# Patient Record
Sex: MALE | Race: WHITE | Employment: OTHER | URBAN - METROPOLITAN AREA
[De-identification: names, ages, dates, MRNs, and addresses within clinical notes are randomized per-mention and may not be internally consistent; named-entity substitution may affect disease eponyms.]

---

## 2021-11-26 ENCOUNTER — OFFICE VISIT (OUTPATIENT)
Dept: FAMILY MEDICINE CLINIC | Age: 56
End: 2021-11-26
Payer: COMMERCIAL

## 2021-11-26 VITALS
DIASTOLIC BLOOD PRESSURE: 89 MMHG | BODY MASS INDEX: 31.58 KG/M2 | WEIGHT: 213.2 LBS | HEART RATE: 70 BPM | SYSTOLIC BLOOD PRESSURE: 126 MMHG | HEIGHT: 69 IN | TEMPERATURE: 96.6 F

## 2021-11-26 DIAGNOSIS — K59.00 CONSTIPATION, UNSPECIFIED CONSTIPATION TYPE: Primary | ICD-10-CM

## 2021-11-26 DIAGNOSIS — Z86.59 HISTORY OF DEPRESSION: ICD-10-CM

## 2021-11-26 DIAGNOSIS — Z13.1 SCREENING FOR DIABETES MELLITUS: ICD-10-CM

## 2021-11-26 DIAGNOSIS — Z76.89 ENCOUNTER TO ESTABLISH CARE WITH NEW DOCTOR: ICD-10-CM

## 2021-11-26 DIAGNOSIS — Z00.00 HEALTH MAINTENANCE EXAMINATION: ICD-10-CM

## 2021-11-26 LAB — HBA1C MFR BLD: 5.8 %

## 2021-11-26 PROCEDURE — G8484 FLU IMMUNIZE NO ADMIN: HCPCS | Performed by: STUDENT IN AN ORGANIZED HEALTH CARE EDUCATION/TRAINING PROGRAM

## 2021-11-26 PROCEDURE — 99203 OFFICE O/P NEW LOW 30 MIN: CPT | Performed by: STUDENT IN AN ORGANIZED HEALTH CARE EDUCATION/TRAINING PROGRAM

## 2021-11-26 PROCEDURE — G8427 DOCREV CUR MEDS BY ELIG CLIN: HCPCS | Performed by: STUDENT IN AN ORGANIZED HEALTH CARE EDUCATION/TRAINING PROGRAM

## 2021-11-26 PROCEDURE — 4004F PT TOBACCO SCREEN RCVD TLK: CPT | Performed by: STUDENT IN AN ORGANIZED HEALTH CARE EDUCATION/TRAINING PROGRAM

## 2021-11-26 PROCEDURE — 3017F COLORECTAL CA SCREEN DOC REV: CPT | Performed by: STUDENT IN AN ORGANIZED HEALTH CARE EDUCATION/TRAINING PROGRAM

## 2021-11-26 PROCEDURE — 83036 HEMOGLOBIN GLYCOSYLATED A1C: CPT | Performed by: STUDENT IN AN ORGANIZED HEALTH CARE EDUCATION/TRAINING PROGRAM

## 2021-11-26 PROCEDURE — G8417 CALC BMI ABV UP PARAM F/U: HCPCS | Performed by: STUDENT IN AN ORGANIZED HEALTH CARE EDUCATION/TRAINING PROGRAM

## 2021-11-26 RX ORDER — POLYETHYLENE GLYCOL 3350 17 G/17G
17 POWDER, FOR SOLUTION ORAL DAILY PRN
Qty: 510 G | Refills: 0 | Status: SHIPPED | OUTPATIENT
Start: 2021-11-26 | End: 2021-12-26

## 2021-11-26 RX ORDER — DOCUSATE SODIUM 100 MG/1
100 CAPSULE, LIQUID FILLED ORAL 2 TIMES DAILY
Qty: 60 CAPSULE | Refills: 0 | Status: SHIPPED | OUTPATIENT
Start: 2021-11-26 | End: 2021-12-26

## 2021-11-26 ASSESSMENT — ENCOUNTER SYMPTOMS
COUGH: 0
BACK PAIN: 1
NAUSEA: 0
DIARRHEA: 0
ABDOMINAL PAIN: 0
COLOR CHANGE: 0
CONSTIPATION: 0
SHORTNESS OF BREATH: 0
CHEST TIGHTNESS: 0
WHEEZING: 0

## 2021-11-26 ASSESSMENT — PATIENT HEALTH QUESTIONNAIRE - PHQ9
SUM OF ALL RESPONSES TO PHQ QUESTIONS 1-9: 2
SUM OF ALL RESPONSES TO PHQ QUESTIONS 1-9: 2
DEPRESSION UNABLE TO ASSESS: FUNCTIONAL CAPACITY MOTIVATION LIMITS ACCURACY
SUM OF ALL RESPONSES TO PHQ9 QUESTIONS 1 & 2: 2
2. FEELING DOWN, DEPRESSED OR HOPELESS: 1
SUM OF ALL RESPONSES TO PHQ QUESTIONS 1-9: 2
1. LITTLE INTEREST OR PLEASURE IN DOING THINGS: 1

## 2021-11-26 NOTE — PROGRESS NOTES
Subjective:    Lenard Bueno is a 55 y.o. male with  has no past medical history on file. No family history on file. Presented tot office today for:  Chief Complaint   Patient presents with    New Patient     EST CARE    Back Pain     Lower Back       HPI     Lenard Bueno is a 29-year-old male with no significant PMH on file. Patient is here today to establish care with this office. Per patient, he moved to the [facility] from [place] back in April 2021. Patient is currently unemployed and living at a shelter. He has no specific complaints today. However, reportedly patient states that he has a history of DJD with possible spinal stenosis. Patient is asking for referral to a surgeon. We will asked the patient at this time to sign a medical release form so we are able to obtain medical records from his physician in [place]. He denies any fever, chills, headaches, dizziness, lightheadedness, chest pain, S OB, palpitations, abdominal pain, nausea, vomiting, bloody stools, saddle anesthesia, urinary/fecal incontinence, numbness, tingling, or weakness    Constipation: Upon review of systems, patient states that he has been having constipation for the last 1 month. Reports having a bowel movement every 3 days. Denies any blood in stools. Patient is a poor historian. Occupation: unemployed  Previous PCP: no sure    PMHx: DJD, PTSD, MDD    FMHx:   -Mother: Alive; Type II DM   -Father: Alive; CAD   -Siblings:    Social Hx:   -Drink: currently does not drink   -Smoke: 1 PPD X 48 years   -Drugs: Allergies: Amoxicillin  Medications: None      Review of Systems   Constitutional: Negative for activity change, appetite change and fever. HENT: Negative for congestion. Respiratory: Negative for cough, chest tightness, shortness of breath and wheezing. Cardiovascular: Negative for chest pain. Gastrointestinal: Negative for abdominal pain, constipation, diarrhea and nausea.    Genitourinary: Negative for difficulty urinating. Musculoskeletal: Positive for back pain. Negative for joint swelling. Skin: Negative for color change. Neurological: Negative for dizziness, weakness, light-headedness, numbness and headaches. Psychiatric/Behavioral: Negative for agitation and confusion. Objective:    /89 (Site: Left Upper Arm, Position: Sitting, Cuff Size: Large Adult)   Pulse 70   Temp 96.6 °F (35.9 °C) (Temporal)   Ht 5' 9\" (1.753 m)   Wt 213 lb 3.2 oz (96.7 kg)   BMI 31.48 kg/m²    BP Readings from Last 3 Encounters:   11/26/21 126/89     Physical Exam  Constitutional:       General: He is not in acute distress. Appearance: Normal appearance. He is not ill-appearing. Cardiovascular:      Rate and Rhythm: Normal rate and regular rhythm. Heart sounds: No murmur heard. No gallop. Pulmonary:      Effort: Pulmonary effort is normal. No respiratory distress. Breath sounds: Normal breath sounds. No wheezing. Abdominal:      General: Bowel sounds are normal. There is no distension. Tenderness: There is no abdominal tenderness. There is no guarding or rebound. Hernia: No hernia is present. Musculoskeletal:         General: No swelling or deformity. Neurological:      General: No focal deficit present. Mental Status: He is alert and oriented to person, place, and time. No results found for: WBC, HGB, HCT, PLT, CHOL, TRIG, HDL, LDLDIRECT, ALT, AST, NA, K, CL, CREATININE, BUN, CO2, TSH, PSA, INR, GLUF, LABA1C, LABMICR  No results found for: CALCIUM, PHOS  No results found for: LDLCALC, LDLCHOLESTEROL, LDLDIRECT    Assessment and Plan:    1. Constipation, unspecified constipation type  -Ordered GlycoLax and Colace 100 mg twice daily  - polyethylene glycol (GLYCOLAX) 17 GM/SCOOP powder; Take 17 g by mouth daily as needed (constipation)  Dispense: 510 g; Refill: 0  - docusate sodium (COLACE) 100 MG capsule;  Take 1 capsule by mouth 2 times daily  Dispense: 60 control planned discussed Healthy diet and regular exercise. BP: 126/89 Blood pressure is normal. Treatment plan consists of No treatment change needed.     No results found for: LDLCALC, LDLCHOLESTEROL, LDLDIRECT (goal LDL reduction with dx if diabetes is 50% LDL reduction)      PHQ Scores 11/26/2021   PHQ2 Score 2   PHQ9 Score 2     Interpretation of Total Score Depression Severity: 1-4 = Minimal depression, 5-9 = Mild depression, 10-14 = Moderate depression, 15-19 = Moderately severe depression, 20-27 = Severe depression

## 2021-11-26 NOTE — PATIENT INSTRUCTIONS
Thank you for letting us take care of you today. We hope all your questions were addressed. If a question was overlooked or something else comes to mind after you return home, please contact a member of your Care Team listed below. Your Care Team at [place] [address] is Team #3  Harjinder Bai MD (Faculty)  Ladan Mabry MD (Faculty  Dianne Stephen MD (Resident)  Yen Shea (Resident)   Kassie Babb MD (Resident)  Donna Maguire MD (Resident)  Steph Cheema., YOHANA Stock., YOHANA Guajardo., Timothy Mathew., Joy Barkley ([address] [place] [address])  Milana Bond (Clinical Practice Manager)  [address] (Clinical Pharmacist)     Office phone number: [phone]    If you need to get in right away due to illness, please be advised we have \"Same Day\" appointments available Monday-Friday. Please call us at [phone] option #3 to schedule your \"Same Day\" appointment.

## 2022-01-18 ENCOUNTER — OFFICE VISIT (OUTPATIENT)
Dept: FAMILY MEDICINE CLINIC | Age: 57
End: 2022-01-18
Payer: COMMERCIAL

## 2022-01-18 ENCOUNTER — TELEPHONE (OUTPATIENT)
Dept: FAMILY MEDICINE CLINIC | Age: 57
End: 2022-01-18

## 2022-01-18 VITALS
TEMPERATURE: 97.2 F | SYSTOLIC BLOOD PRESSURE: 136 MMHG | HEIGHT: 69 IN | WEIGHT: 206.4 LBS | DIASTOLIC BLOOD PRESSURE: 83 MMHG | BODY MASS INDEX: 30.57 KG/M2 | HEART RATE: 80 BPM

## 2022-01-18 DIAGNOSIS — Z13.220 SCREENING FOR HYPERLIPIDEMIA: ICD-10-CM

## 2022-01-18 DIAGNOSIS — M54.16 LUMBAR RADICULOPATHY: Primary | ICD-10-CM

## 2022-01-18 PROCEDURE — 99213 OFFICE O/P EST LOW 20 MIN: CPT | Performed by: STUDENT IN AN ORGANIZED HEALTH CARE EDUCATION/TRAINING PROGRAM

## 2022-01-18 PROCEDURE — G8427 DOCREV CUR MEDS BY ELIG CLIN: HCPCS | Performed by: STUDENT IN AN ORGANIZED HEALTH CARE EDUCATION/TRAINING PROGRAM

## 2022-01-18 PROCEDURE — 3017F COLORECTAL CA SCREEN DOC REV: CPT | Performed by: STUDENT IN AN ORGANIZED HEALTH CARE EDUCATION/TRAINING PROGRAM

## 2022-01-18 PROCEDURE — G8484 FLU IMMUNIZE NO ADMIN: HCPCS | Performed by: STUDENT IN AN ORGANIZED HEALTH CARE EDUCATION/TRAINING PROGRAM

## 2022-01-18 PROCEDURE — 99211 OFF/OP EST MAY X REQ PHY/QHP: CPT | Performed by: FAMILY MEDICINE

## 2022-01-18 PROCEDURE — G8417 CALC BMI ABV UP PARAM F/U: HCPCS | Performed by: STUDENT IN AN ORGANIZED HEALTH CARE EDUCATION/TRAINING PROGRAM

## 2022-01-18 PROCEDURE — 4004F PT TOBACCO SCREEN RCVD TLK: CPT | Performed by: STUDENT IN AN ORGANIZED HEALTH CARE EDUCATION/TRAINING PROGRAM

## 2022-01-18 RX ORDER — IBUPROFEN 600 MG/1
600 TABLET ORAL 3 TIMES DAILY PRN
Qty: 90 TABLET | Refills: 0 | Status: SHIPPED | OUTPATIENT
Start: 2022-01-18 | End: 2022-05-04 | Stop reason: SDUPTHER

## 2022-01-18 ASSESSMENT — ENCOUNTER SYMPTOMS
CONSTIPATION: 0
ABDOMINAL PAIN: 0
SHORTNESS OF BREATH: 0
DIARRHEA: 0
WHEEZING: 0
COUGH: 0
CHEST TIGHTNESS: 0
COLOR CHANGE: 0
NAUSEA: 0

## 2022-01-18 NOTE — PROGRESS NOTES
Visit Information    Have you changed or started any medications since your last visit including any over-the-counter medicines, vitamins, or herbal medicines? no   Have you stopped taking any of your medications? Is so, why? -  no  Are you having any side effects from any of your medications? - no    Have you seen any other physician or provider since your last visit?  no   Have you had any other diagnostic tests since your last visit?  no   Have you been seen in the emergency room and/or had an admission in a hospital since we last saw you?  no   Have you had your routine dental cleaning in the past 6 months?  no     Do you have an active MyChart account? If no, what is the barrier?   No: code     Patient Care Team:  Jay Jay Kendall MD as PCP - General (Emergency Medicine)    Medical History Review  Past Medical, Family, and Social History reviewed and does not contribute to the patient presenting condition    Health Maintenance   Topic Date Due    Hepatitis C screen  Never done    COVID-19 Vaccine (1) Never done    Pneumococcal 0-64 years Vaccine (1 of 2 - PPSV23) Never done    HIV screen  Never done    DTaP/Tdap/Td vaccine (1 - Tdap) Never done    Lipid screen  Never done    Colon cancer screen colonoscopy  Never done    Shingles Vaccine (1 of 2) Never done    Low dose CT lung screening  Never done    Flu vaccine (1) Never done    A1C test (Diabetic or Prediabetic)  2022    Depression Screen  2022    Hepatitis A vaccine  Aged Out    Hepatitis B vaccine  Aged Out    Hib vaccine  Aged Out    Meningococcal (ACWY) vaccine  Aged Out

## 2022-01-18 NOTE — PROGRESS NOTES
Subjective:    Karie Still is a 55 y.o. male with  has no past medical history on file. No family history on file. Presented tothe office today for:  Chief Complaint   Patient presents with    Referral - General     orthopedica provider back    [facility] Maintenance     declined vaccines, and cologuard/colonscopy        HPI  Karie Still is a 70-year-old male with no significant PMH on file. Patient is here today requesting referral to an orthopedic surgeon. Patient was last seen in the office on 11/26/2021. At the time, patient was establishing care with this practice. Patient moved to the [facility] from [place] back in April 2021. He is currently unemployed and lives at a shelter. Reportedly, patient has a history of degenerative disc disease as well as spinal stenosis. Patient was asked during last visit to fill out a medical release form. However, records are still not available. Patient reports diffuse lower back pain radiating to both legs and feet. Reports numbness, tingling, and burning sensations. No fecal or urinary incontinence. His pain is not relieved by leaning forward. He rates the pain 6/10 today with recent exacerbations worsening his pain to 9/10. He claims to have a prior MRI done 01/21 that we have been unable to obtain. He reports to have degenerative changes leading to compression of his nerve roots, causing his symptoms. His pain was previously managed with steroid shots which substantially improved his symptoms. I will order an MRI to confirm radiculopathy. Patient is also asking for paperwork to be filled out. Review of Systems   Constitutional: Negative for activity change, appetite change and fever. HENT: Negative for congestion. Respiratory: Negative for cough, chest tightness, shortness of breath and wheezing. Cardiovascular: Negative for chest pain. Gastrointestinal: Negative for abdominal pain, constipation, diarrhea and nausea. Genitourinary: Negative for difficulty urinating. Musculoskeletal: Negative for joint swelling. Skin: Negative for color change. Neurological: Positive for numbness. Negative for dizziness, weakness, light-headedness and headaches. Psychiatric/Behavioral: Negative for agitation and confusion. Objective:    /83 (Site: Left Upper Arm, Position: Sitting, Cuff Size: Medium Adult) Comment: machine  Pulse 80   Temp 97.2 °F (36.2 °C) (Temporal)   Ht 5' 9\" (1.753 m)   Wt 206 lb 6.4 oz (93.6 kg)   BMI 30.48 kg/m²    BP Readings from Last 3 Encounters:   01/18/22 136/83   11/26/21 126/89     Physical Exam  Constitutional:       General: He is not in acute distress. Appearance: Normal appearance. He is not ill-appearing. HENT:      Head: Normocephalic and atraumatic. Mouth/Throat:      Mouth: Mucous membranes are moist.   Eyes:      Pupils: Pupils are equal, round, and reactive to light. Cardiovascular:      Rate and Rhythm: Normal rate and regular rhythm. Heart sounds: No murmur heard. No gallop. Pulmonary:      Effort: Pulmonary effort is normal. No respiratory distress. Breath sounds: Normal breath sounds. No wheezing. Abdominal:      General: Bowel sounds are normal. There is no distension. Tenderness: There is no abdominal tenderness. There is no guarding or rebound. Musculoskeletal:         General: No swelling or deformity. Neurological:      General: No focal deficit present. Mental Status: He is alert and oriented to person, place, and time. Psychiatric:         Mood and Affect: Mood normal.         Thought Content: Thought content normal.           Lab Results   Component Value Date    LABA1C 5.8 11/26/2021     No results found for: CALCIUM, PHOS  No results found for: LDLCALC, LDLCHOLESTEROL, LDLDIRECT    Assessment and Plan:    1.  Lumbar radiculopathy  -Unable to obtain previous medical records from [place]  -Will order MRI lumbar spine for suspected lumbar radiculopathy  -Will refer patient to Dr. Miguelina Galavizter orthopedic for further evaluation  -Scribed Motrin 600 mg 3 times daily as needed for pain  - MRI LUMBAR [address]; Future  - [facility] Jude Maldonado DO, Orthopedic Surgery, [facility]  - ibuprofen (ADVIL;MOTRIN) 600 MG tablet; Take 1 tablet by mouth 3 times daily as needed for Pain  Dispense: 90 tablet; Refill: 0    2. Screening for hyperlipidemia  -Reprinted labs          Requested Prescriptions     Signed Prescriptions Disp Refills    ibuprofen (ADVIL;MOTRIN) 600 MG tablet 90 tablet 0     Sig: Take 1 tablet by mouth 3 times daily as needed for Pain       There are no discontinued medications. Return in about 2 months (around 3/18/2022), or F/u low back pain. Ibis Linares received counseling on the following healthy behaviors:   Reviewed prior labs and health maintenance  Continue current medications, diet and exercise. Discussed use, benefit, and side effects of prescribed medications. Barriers to medication compliance addressed. Patient given educational materials - see patient instructions  Was a self-tracking handout given in paper form or via [employer]? No:     Requested Prescriptions     Signed Prescriptions Disp Refills    ibuprofen (ADVIL;MOTRIN) 600 MG tablet 90 tablet 0     Sig: Take 1 tablet by mouth 3 times daily as needed for Pain       All patient questions answered. Patient voiced understanding. Quality Measures    Body mass index is 30.48 kg/m². Elevated. Weight control planned discussed Healthy diet and regular exercise. BP: 136/83 (machine) Blood pressure is normal. Treatment plan consists of No treatment change needed.     No results found for: LDLCALC, LDLCHOLESTEROL, LDLDIRECT (goal LDL reduction with dx if diabetes is 50% LDL reduction)      PHQ Scores 11/26/2021   PHQ2 Score 2   PHQ9 Score 2     Interpretation of Total Score Depression Severity: 1-4 = Minimal depression, 5-9 = Mild depression, 10-14 = Moderate depression, 15-19 = Moderately severe depression, 20-27 = Severe depression

## 2022-01-18 NOTE — PATIENT INSTRUCTIONS
Thank you for letting us take care of you today. We hope all your questions were addressed. If a question was overlooked or something else comes to mind after you return home, please contact a member of your Care Team listed below. Your Care Team at [place] [address] is Team #3  Katelyn Patterson MD (Faculty)  Chriss Caban MD (Faculty  Estlacey Reyes MD (Resident)  Logan Paz (Resident)   Marianne Peck MD (Resident)  Susu Leach MD (Resident)  Benita Burton., YOHANA Aquino., YOHANA Aldridge., Martir Borrego., David Campoverde ([address] [place] [address])  [facility] (Clinical Practice Manager)  Susu Lopez, [address] [place] [address] (Clinical Pharmacist)     Office phone number: [phone]    If you need to get in right away due to illness, please be advised we have \"Same Day\" appointments available Monday-Friday. Please call us at [phone] option #3 to schedule your \"Same Day\" appointment.

## 2022-01-27 ENCOUNTER — HOSPITAL ENCOUNTER (OUTPATIENT)
Dept: MRI IMAGING | Facility: CLINIC | Age: 57
Discharge: HOME | End: 2022-01-29
Payer: COMMERCIAL

## 2022-01-27 DIAGNOSIS — M54.16 LUMBAR RADICULOPATHY: ICD-10-CM

## 2022-01-27 PROCEDURE — 72148 MRI LUMBAR SPINE W/O DYE: CPT

## 2022-02-14 DIAGNOSIS — M54.50 LOW BACK PAIN, UNSPECIFIED BACK PAIN LATERALITY, UNSPECIFIED CHRONICITY, UNSPECIFIED WHETHER SCIATICA PRESENT: Primary | ICD-10-CM

## 2022-02-24 ENCOUNTER — OFFICE VISIT (OUTPATIENT)
Dept: ORTHOPEDIC SURGERY | Age: 57
End: 2022-02-24
Payer: COMMERCIAL

## 2022-02-24 VITALS — HEIGHT: 69 IN | WEIGHT: 206 LBS | BODY MASS INDEX: 30.51 KG/M2

## 2022-02-24 DIAGNOSIS — M54.50 LUMBAR PAIN: Primary | ICD-10-CM

## 2022-02-24 PROCEDURE — G8417 CALC BMI ABV UP PARAM F/U: HCPCS | Performed by: STUDENT IN AN ORGANIZED HEALTH CARE EDUCATION/TRAINING PROGRAM

## 2022-02-24 PROCEDURE — 3017F COLORECTAL CA SCREEN DOC REV: CPT | Performed by: STUDENT IN AN ORGANIZED HEALTH CARE EDUCATION/TRAINING PROGRAM

## 2022-02-24 PROCEDURE — G8427 DOCREV CUR MEDS BY ELIG CLIN: HCPCS | Performed by: STUDENT IN AN ORGANIZED HEALTH CARE EDUCATION/TRAINING PROGRAM

## 2022-02-24 PROCEDURE — 4004F PT TOBACCO SCREEN RCVD TLK: CPT | Performed by: STUDENT IN AN ORGANIZED HEALTH CARE EDUCATION/TRAINING PROGRAM

## 2022-02-24 PROCEDURE — 99203 OFFICE O/P NEW LOW 30 MIN: CPT | Performed by: STUDENT IN AN ORGANIZED HEALTH CARE EDUCATION/TRAINING PROGRAM

## 2022-02-24 PROCEDURE — G8484 FLU IMMUNIZE NO ADMIN: HCPCS | Performed by: STUDENT IN AN ORGANIZED HEALTH CARE EDUCATION/TRAINING PROGRAM

## 2022-02-24 NOTE — PROGRESS NOTES
[facility]  [id number] [facility]  Dept Phone: [phone]  Dept Fax: [phone]      Orthopaedic Trauma New Patient      CHIEF COMPLAINT:    Chief Complaint   Patient presents with    New Patient     LUMBAR PAIN        HISTORY OF PRESENT ILLNESS:    The patient is a 55 y.o. male who is being seen as a new patient for back pain with radiation of pain in bilateral legs. Patient notes that he has been treated up in [place] for his left knee, lumbar spine, as well as cervical spine. He notes that he had to come to [place] to take care of family at this time. He is having continued worsening pain especially with sitting. He notes pain is worse with bending over as well as walking up hills. Pain is located in the lower lumbar spine bilaterally and radiates into both legs especially along the lateral aspect of the dorsum of the foot. .  Reports occasional numbness as well. He denies any bowel or bladder incontinence. He denies any apparent perineal dysesthesias. Past Medical History:    History reviewed. No pertinent past medical history. Past Surgical History:    History reviewed. No pertinent surgical history. Current Medications:   Current Outpatient Medications   Medication Sig Dispense Refill    ibuprofen (ADVIL;MOTRIN) 600 MG tablet Take 1 tablet by mouth 3 times daily as needed for Pain 90 tablet 0     No current facility-administered medications for this visit.        Allergies:    Amoxicillin    Social History:   Social History     Socioeconomic History    Marital status: Unknown     Spouse name: Not on file    Number of children: Not on file    Years of education: Not on file    Highest education level: Not on file   Occupational History    Not on file   Tobacco Use    Smoking status: Current Every Day Smoker     Packs/day: 1.00     Years: 48.00     Pack years: 48.00     Types: Cigarettes    Smokeless tobacco: Never Used   Substance and BMI 30.42 kg/m² Body mass index is 30.42 kg/m². Physical Exam  Gen: alert and oriented, no acute distress  Psych: Appropriate affect; Appropriate knowledge base; Appropriate mood; No hallucinations  Head: normocephalic atraumatic   Chest: symmetric chest excursion  Ortho Exam  MSK:   Lumbar spine: Patient is tender to palpation bilateral paraspinal musculature of the lower lumbar spine. Increased pain with forward flexion. Improved pain with extension. No significant pain with rotation of the lumbar spine. Negative Babinski, negative clonus. Able to toe walk and heel walk however some weakness with heel walking. 2 through S1 motor function is intact, L2-S1 sensation is intact    Radiology:   History: 64-year-old male chronic lumbar back pain    Comparison: None    Findings: 3 views of the lumbar spine  in a skeletally mature patient showing disc degeneration between L5-S1. Facet spondylosis L4-L5, L5-S1. Impression:    Degenerative disc disease L5S1, spondylosis L4-L5, L5-S1    History: 64-year-old male chronic lumbar back pain    Comparison: None    Findings: 2 views of the lumbar spine  in a skeletally mature patient showing disc degeneration between L5-S1. Facet spondylosis L4-L5, L5-S1. Flexion and extension views not demonstrating any significant spondylolisthesis. Impression:    Degenerative disc disease L5S1, spondylosis L4-L5, L5-S1       ASSESSMENT:  55 y.o. male with lumbar stenosis L4S1 with radicular symptoms    PLAN:  Had a long discussion with the patient about the nature and etiology of his lumbar back pain with radicular symptoms. Discussed treatment options including both conservative and surgical management. Patient has tried physical therapy, injections with mild relief of his symptoms. He notes that he has been falling due to weakness in his legs recently.   At this time we are recommending referral to neurosurgery for possible surgical all questions were addressed and patient was in agreement with this plan    Orders Placed This Encounter   Procedures    XR LUMBAR SPINE FLEXION AND EXTENSION ONLY     Standing Status:   Future     Number of Occurrences:   1     Standing Expiration Date:   2/24/2023     Order Specific Question:   Reason for exam:     Answer:   AP, Lateral, Flex/Ex    Dalia Rocha DO, Neurosurgery, [facility]     Referral Priority:   Routine     Referral Type:   Eval and Treat     Referral Reason:   Specialty Services Required     Referred to Provider:   Adrian Villarreal DO     Requested Specialty:   Neurosurgery     Number of Visits Requested:   1        Electronically signed by Yessi Sandoval DO on 2/24/2022 at 10:10 AM    This note is created with the assistance of a speech recognition program.  While intending to generate a document that actually reflects the content of the visit, the document can still have some errors including those of syntax and sound a like substitutions which may escape proof reading.   In such instances, actual meaning can be extrapolated by contextual diversion

## 2022-03-07 ENCOUNTER — OFFICE VISIT (OUTPATIENT)
Dept: NEUROSURGERY | Age: 57
End: 2022-03-07
Payer: COMMERCIAL

## 2022-03-07 VITALS
HEIGHT: 69 IN | HEART RATE: 64 BPM | WEIGHT: 226 LBS | OXYGEN SATURATION: 92 % | SYSTOLIC BLOOD PRESSURE: 129 MMHG | BODY MASS INDEX: 33.47 KG/M2 | TEMPERATURE: 97.3 F | DIASTOLIC BLOOD PRESSURE: 89 MMHG

## 2022-03-07 DIAGNOSIS — M47.26 OTHER SPONDYLOSIS WITH RADICULOPATHY, LUMBAR REGION: Primary | ICD-10-CM

## 2022-03-07 PROCEDURE — G8484 FLU IMMUNIZE NO ADMIN: HCPCS | Performed by: NEUROLOGICAL SURGERY

## 2022-03-07 PROCEDURE — G8417 CALC BMI ABV UP PARAM F/U: HCPCS | Performed by: NEUROLOGICAL SURGERY

## 2022-03-07 PROCEDURE — G8427 DOCREV CUR MEDS BY ELIG CLIN: HCPCS | Performed by: NEUROLOGICAL SURGERY

## 2022-03-07 PROCEDURE — 3017F COLORECTAL CA SCREEN DOC REV: CPT | Performed by: NEUROLOGICAL SURGERY

## 2022-03-07 PROCEDURE — 4004F PT TOBACCO SCREEN RCVD TLK: CPT | Performed by: NEUROLOGICAL SURGERY

## 2022-03-07 PROCEDURE — 99204 OFFICE O/P NEW MOD 45 MIN: CPT | Performed by: NEUROLOGICAL SURGERY

## 2022-03-07 PROCEDURE — 62290 NJX PX DISCOGRAPHY LUMBAR: CPT | Performed by: NEUROLOGICAL SURGERY

## 2022-03-07 RX ORDER — DOCUSATE SODIUM 100 MG/1
1 CAPSULE, LIQUID FILLED ORAL 2 TIMES DAILY PRN
COMMUNITY
Start: 2022-01-19 | End: 2022-05-04 | Stop reason: SDUPTHER

## 2022-03-07 RX ORDER — PREDNISONE 20 MG/1
TABLET ORAL
Qty: 30 TABLET | Refills: 0 | Status: SHIPPED | OUTPATIENT
Start: 2022-03-07 | End: 2022-03-22

## 2022-03-07 NOTE — PROGRESS NOTES
[address]  [facility] [address] [facility] [address]  Dept: [phone]    Patient:  Blank Bradley  YOB: 1965  Date: 3/7/22    The patient is a 55 y.o. male who presents today for consult of the following problems:     Chief Complaint   Patient presents with    New Patient    Back Pain             HPI:     Blank Bradley is a 55 y.o. male on whom neurosurgical consultation was requested by Joselyn Hilario MD for management of lumbar spondylosis radiculopathy. The patient has had ongoing progressive symptoms over the course of last 1 to 2 years with significant axial back pain radiating down bilateral lower extremities approximately L5-S1 distribution. Her back pain is consistent approximate 4-5 out of 10 ambulation and bending forward or sitting worsens the pain. Intermittent shooting pain with numbness and tingling of both feet without any specific dermatomal distribution but predominately peers be L5. Has been to physical therapy greater than 1 year ago but no other recent injections. Was seen orthopedic surgery for question of hip involvement. .      History:     No past medical history on file. No past surgical history on file. No family history on file. Current Outpatient Medications on File Prior to Visit   Medication Sig Dispense Refill    docusate sodium (COLACE) 100 MG capsule Take 1 capsule by mouth 2 times daily as needed      ibuprofen (ADVIL;MOTRIN) 600 MG tablet Take 1 tablet by mouth 3 times daily as needed for Pain 90 tablet 0     No current facility-administered medications on file prior to visit.      Social History     Tobacco Use    Smoking status: Current Every Day Smoker     Packs/day: 1.00     Years: 48.00     Pack years: 48.00     Types: Cigarettes    Smokeless tobacco: Never Used   Substance Use Topics    Alcohol use: Never    Drug use: Yes     Types: Marijuana (Weed)       Allergies   Allergen Reactions    Amoxicillin        Review of Systems  ROS: Back and leg pain. Numbness    Physical Exam:      /89   Pulse 64   Temp 97.3 °F (36.3 °C) (Temporal)   Ht 5' 9\" (1.753 m)   Wt 226 lb (102.5 kg)   SpO2 92%   BMI 33.37 kg/m²   Estimated body mass index is 33.37 kg/m² as calculated from the following:    Height as of this encounter: 5' 9\" (1.753 m). Weight as of this encounter: 226 lb (102.5 kg). General:  Riki Briones is a 55y.o. year old male who appears his stated age. HEENT: Normocephalic atraumatic. Neck supple. Chest: regular rate; pulses equal. Equal chest rise and fall  Abdomen: Soft nondistended. Ext: DP equal with good capillary refill  Neuro    Mentation  Appropriate affect   oriented    Cranial Nerves:   Pupils equal and reactive to light  Extraocular motion intact  Face symmetric  No dysarthria  v1-3 sensation symmetric, masseter tone symmetric  Hearing symmetric and intact to finger rub    Sensation:   Syncope numbness to bilateral feet    Motor  L deltoid 5/5; R deltoid 5/5  L biceps 5/5; R biceps 5/5  L triceps 5/5; R triceps 5/5  L wrist extension 5/5; R wrist extension 5/5  L intrinsics 5/5; R intrinsics 5/5     L iliopsoas 5/5 , R iliopsoas 5/5  L quadriceps 5/5; R quadriceps 5/5  L Dorsiflexion 5/5; R dorsiflexion 5/5  L Plantarflexion 5/5; R plantarflexion 5/5  L EHL 5/5; R EHL 5/5    Reflexes  L Brachioradialis 2+/4; R brachioradialis 2+/4  L Biceps 2+/4; R Biceps 2+/4  L Triceps 2+/4; R Triceps 2+/4  L Patellar 2+/4: R Patellar 2+/4  L Achilles 2+/4; R Achilles 2+/4    hoffmans L: neg  hoffmans R: neg  Clonus L: neg  Clonus R: neg  Babinski L: up  Babinski R; up    +Pain on flexion versus extension. Negative tenderness of the greater trochanters or the SI joint    Negative Frederich Sabino.   Negative straight leg raise    Studies Review:     Lumbar MRI shows significant degenerative disc disease at L4-5 L5-S1 broad-based protrusion with foraminal disc protrusions and stenosis. Assessment and Plan:      1. Other spondylosis with radiculopathy, lumbar region          Plan: Patient has not had any recent physical therapy or injections thus far. BMI is 33 and he is still actively smoking. Recommended multimodal conservative measures to avoid surgery including physical therapy oriented towards core strengthening, postural training, stretches along with epidural injection likely L5-S1 interlaminar as well as smoking cessation and use of insoles. In the interim we will complete surgical work-up with scoliosis films looking at overall spinal alignment along with a discogram oriented towards L3-4 and L4-5 to see if we can exclude L2-3 and L3-4 as etiologies for her axial symptoms. Followup: No follow-ups on file. Prescriptions Ordered:  No orders of the defined types were placed in this encounter.        Orders Placed:  Orders Placed This Encounter   Procedures    XR SPINE ENTIRE (2-3 VIEWS)     Standing Status:   Future     Standing Expiration Date:   3/7/2023     Scheduling Instructions:      STANDING -- AP and Lateral; Include C2 to Pelvis & both femoral heads     Order Specific Question:   Reason for exam:     Answer:   scoliosis    CT LUMBAR SPINE W CONTRAST     Standing Status:   Future     Standing Expiration Date:   3/7/2023     Order Specific Question:   STAT Creatinine as needed:     Answer:   Yes     Order Specific Question:   Reason for exam:     Answer:   post discogram    [facility] Physical Therapy - [facility]     Referral Priority:   Routine     Referral Type:   Eval and Treat     Referral Reason:   Specialty Services Required     Requested Specialty:   Physical Therapy     Number of Visits Requested:   [facility] Kvng Palmer MD, Pain Management, [place]     Referral Priority:   Routine     Referral Type:   Eval and Treat     Referral Reason:   Specialty Services Required     Referred to Provider:   Arminda Vang MD     Requested Specialty:   Pain Management     Number of Visits Requested:   1    MD INJECT [facility]     L3/4 and L4/5        Electronically signed by Cornell English DO on 3/7/2022 at 2:28 PM    Please note that this chart was generated using voice recognition Dragon dictation software. Although every effort was made to ensure the accuracy of this automated transcription, some errors in transcription may have occurred.

## 2022-03-09 DIAGNOSIS — M47.26 OTHER SPONDYLOSIS WITH RADICULOPATHY, LUMBAR REGION: Primary | ICD-10-CM

## 2022-03-14 ENCOUNTER — HOSPITAL ENCOUNTER (OUTPATIENT)
Dept: PHYSICAL THERAPY | Age: 57
Setting detail: THERAPIES SERIES
Discharge: HOME | End: 2022-03-14
Payer: COMMERCIAL

## 2022-03-14 PROCEDURE — 97012 MECHANICAL TRACTION THERAPY: CPT

## 2022-03-14 PROCEDURE — 97162 PT EVAL MOD COMPLEX 30 MIN: CPT

## 2022-03-14 NOTE — CONSULTS
[x] [facility]) - [facility] &  Therapy  [address]  [phone]  F: [phone] [] [email or web address]  [facility] [place] [address]   [address]  P: [phone]  F: [phone] [] [address] &  Therapy  [address]  P: [phone]  F: [phone] [] [address]  P: [phone]  F: [phone] [] [address]  [facility]   [address]   [place]: [phone]  F: [phone]      Physical Therapy Spine Evaluation    Date:  3/14/2022  Patient: Keshav Seay  : 1965  MRN: [id number]  Physician: Dr. Ca Frey,      [facility], [facility] yearly  Medical Diagnosis: other spondylosis with radiculopathy, lumbar region   Rehab Codes: M 54.59, M 62.81, M 62.830, Z 91.81, R 29.3  Onset Date: 3/7/22    Next 's appt. : 22    Subjective:   CC:  Can barely sit - limited to about 10 minutes. Last 1.5 months has preferred to walk/stand, and now it is starting to hurt. Cannot stand to get dressed. Carrying/lifting - any amount of weight. Carrying a full laundry basket is painful. Able to control bowels/bladder. HPI: (3/7/22)  Part of left knee cap is missing. Was falling a lot when lived in [place]. Left knee needs to be replaced. Skull fx in [place] 3 years ago - coma x 1.5 months. When he started falling he was hesitant to do surgery due to fear/uncomfortableness with hospitals. Regularly started falling down steps about 2 years ago. It was usually the right knee giving out.       PMHx: [] Unremarkable [] Diabetes [] HTN  [] Pacemaker   [] MI/Heart Problems [] Cancer [] Arthritis [x] Other: Panic attacks, arthritis, depression              [x] Refer to full medical chart  In EPIC     Comorbidities:   [x] Obesity [] Dialysis  [] N/A   [] Asthma/COPD [] Dementia [] Other:   [] Stroke [] Sleep apnea [] Other:   [] Vascular disease [] Rheumatic disease [] Other:     Tests: [x] X-Ray: 2/14/22 [x] MRI: 1/27/22    1. Severe L5-S1 degenerative disc height loss and desiccation with associated   2 mm degenerative retrolisthesis of L5 on S1.   2. Spinal canal stenoses, moderate at L4-5 and mild at L2-3, L3-4, and L5-S1.   3. Severe bilateral L5 neural foraminal narrowing. [] Other:    Medications: [x] Refer to full medical record [] None [] Other:  Allergies:      [x] Refer to full medical record [] None [] Other:    Function:  Hand Dominance  [x] Right  [] Left  Patient lives with: Alone   In what type of home [x]  One story   [] Two story   [] Split level   Number of stairs to enter    With handrail on the []  Right to enter   [] Left to enter   Bathroom has a []  Tub only  [x] Tub/shower combo   [] Walk in shower    []  Grab bars   Washing machine is on []  Main level   [] Second level   [] Basement   Employer Not employed   Job Status []  Normal duty   [] Light duty   [] Off due to condition    []  Retired   [] Not employed   [x] Disability  [] Other:  []  Return to work:    Work activities/duties      ADL/IADL Previous level of function Current level of function Who currently assists the patient with task   Bathing  [x] Independent  [] Assist [x] Independent  [] Assist    Dress/grooming [x] Independent  [] Assist [x] Independent  [] Assist    Transfer/mobility [x] Independent  [] Assist [x] Independent  [] Assist    Feeding [x] Independent  [] Assist [x] Independent  [] Assist    Toileting [x] Independent  [] Assist [x] Independent  [] Assist    Driving [x] Independent  [] Assist [x] Independent  [] Assist    Housekeeping [x] Independent  [] Assist [x] Independent  [] Assist    Grocery shop/meal prep [x] Independent  [] Assist [x] Independent  [] Assist      Gait Prior level of function Current level of function    [x] Independent  [] Assist [x] Independent  [] Assist   Device: [x] Independent [x] Independent    [] Straight Cane [] Quad cane [] Straight Cane [] Quad cane    [] Standard walker [] Rolling walker   [] 4 wheeled walker [] Standard walker [] Rolling walker   [] 4 wheeled walker    [] Wheelchair [] Wheelchair     Pain:  [x] Yes  [] No Location: Back   Pain Rating: (0-10 scale) 6+/10  Pain altered Tx:  [] Yes  [x] No  Action:    Symptoms:  [] Improving [x] Worsening [] Same  Better:  [] AM    [] PM    [] Sit    [] Rise/Sit    []Stand    [x] Walk    [] Lying    [x] Other: side on bed. Worse: [] AM    [] PM    [] Sit    [] Rise/Sit    []Stand    [] Walk    [] Lying    [] Bend                      [] Valsalva    [x] Other: everything else  Sleep: [] OK    [x] Disturbed    Objective:   STRENGTH  ROM    Left Right Cervical    L1-2 Hip Flex </= 3/5 </= 3/5 Flexion    Hip Abd </= 3/5 </= 3/5 Extension    L3-4 Knee Ext </= 3/5 </= 3/5 Rotation L R   L4 Ankle DF </= 3/5 </= 3/5 Sidebend L R   L5 EHL   Retraction    S1 Plant. Flex   Lumbar    Abdominals GERRY  Flexion 48   Erector Spinae GERRY  Extension 6      Rotation L decr 50% R decr 50%      Sidebend L R     TESTS (+/-) LEFT RIGHT Not Tested   SLR [] sit [] supine   [x]   Hamstring (SLR)   [x]   SKTC   [x]   DKTC   [x]   Slump/Dural   [x]   SI JT   [x]   Vinay Tests ? Pain ?  Pain No Change Not Tested   RFIS [] [] [] [x]   PEARL [] [] [] [x]   RFIL [] [] [] [x]   REIL [] [] [] [x]     OBSERVATION No Deficit Deficit Not Tested Comments   Posture       Forward Head [] [x] []    Rounded Shoulders [] [x] []    Kyphosis [] [x] []    Lordosis [x] [] []    Lateral Shift [] [] [x]    Scoliosis [] [] [x]    Iliac Crest [] [x] [] Pain   PSIS [] [] [] Pain   ASIS [] [] [x]    Genu Valgus [x] [] []    Genu Varus [x] [] []    Genu Recurvatum [x] [] []    Pronation [x] [] []    Supination [x] [] []    Leg Length Discrp [] [] [x]    Slumped Sitting [] [x] []    Palpation [] [x] [] Throughout lumbar paraspinals, greater trochanter, ischial tuberosities   Sensation [x] [] []    Edema [x] [] []    Neurological [x] [] []      Functional Test: Oswestry Score: 76% functionally impaired     Comments: Patient was not amendable to special testing or MMT due to severe pain today. Patient stood during evaluation. Patient reported that when he had PT in [place], the only thing that felt good was traction. He was even given a home traction unit which was a belt that someone would hook around themselves and the patients legs and pull. Reports prone exacerbates his pain. Assessment:  Patient would benefit from skilled physical therapy services in order to: decrease low back pain, improve gait and balance, while decreasing risk of falls, improving function, improve strength in core and B LE. Problems:    [x] ? Pain:  [x] ? ROM:  [x] ? Strength:  [x] ? Function:  [x] Other: SLS right leg 2 seconds, left 5 seconds. STG: (to be met in 10 treatments)  1. ? Pain: Low back pain improve to 5/10 with sitting for 15 minutes  2. ? ROM: Lumbar flexion improve to 55 degrees without pain  3. ? Strength: Patient to demonstrate BLE strength of 4/5  4. ? Function: Patient to report ability to sit 15 minutes without severe back pain occurring. 5. Patient to report improved ability to get dressed with better balance in legs due to decreased back pain. 6. Patient to be independent with home exercise program as demonstrated by performance with correct form without cues. 7. Demonstrate Knowledge of fall prevention    Patient goals: \"Decrease in some of the pains\"    Rehab Potential:  [] Good  [x] Fair  [] Poor   Suggested Professional Referral:  [x] No  [] Yes:  Barriers to Goal Achievement:  [] No  [x] Yes: long h/o back pain; was to have back surgery in [place], but opted to not due to fear of the surgery/outcomes.   Domestic Concerns:  [x] No  [] Yes:    Pt. Education:  [x] Plans/Goals, Risks/Benefits discussed  [] Home exercise did state the pressure was improved. Specific Instructions for next treatment:  Continue traction. If patient is amendable, add gentle stretching to patients tolerance in supine. Can add estim and heat/cold during traction if patient desires. Prone aggravates pain (per pt report), so please avoid that position. Evaluation Complexity:  History (Personal factors, comorbidities) [] 0 [] 1-2 [x] 3+   Exam (limitations, restrictions) [] 1-2 [x] 3 [] 4+   Clinical presentation (progression) [] Stable [x] Evolving  [] Unstable   Decision Making [] Low [x] Moderate [] High    [] Low Complexity [x] Moderate Complexity [] High Complexity       Treatment Charges: Mins Units   [x] Evaluation       []  Low       [x]  Moderate       []  High 20 1   []  Modalities     []  Ther Exercise     []  Manual Therapy     []  Ther Activities     []  Aquatics     []  Vasocompression     [x]  Other traction 14 1     TOTAL TREATMENT TIME: 34    Time in: 0808      Time out: 3578    Electronically signed by: Mary Ann Escalera PT        Physician Signature:________________________________Date:__________________  By signing above or cosigning this note, I have reviewed this plan of care and certify a need for medically necessary rehabilitation services.      *PLEASE SIGN ABOVE AND FAX BACK ALL PAGES*

## 2022-03-15 NOTE — FLOWSHEET NOTE
Jared Fall Risk Assessment    Patient Name:  Tex Marcum  : 1965      Risk Factor Scale  Score   History of Falls [x] Yes  [] No 25  0 25   Secondary Diagnosis [] Yes  [x] No 15  0 0   Ambulatory Aid [] Furniture  [] Crutches/cane/walker  [x] None/bedrest/wheelchair/nurse 30  15  0 0   IV/Heparin Lock [] Yes  [x] No 20  0 0   Gait/Transferring [] Impaired  [x] Weak  [] Normal/bedrest/immobile 20  10  0 10   Mental Status [] Forgets limitations  [x] Oriented to own ability 15  0 0      Total: 35     Based on the Assessment score: check the appropriate box.     []  No intervention needed   Low =   Score of 0-24    [x]  Use standard prevention interventions Moderate =  Score of 24-44   [x] Give patient handout and discuss fall prevention strategies   [x] Establish goal of education for patient/family RE: fall prevention strategies    []  Use high risk prevention interventions High = Score of 45 and higher   [] Give patient handout and discuss fall prevention strategies   [] Establish goal of education for patient/family Re: fall prevention strategies   [] Discuss lifeline / other resources    Electronically signed by:   Peter Jett PT  Date: 3/14/2022

## 2022-03-16 ENCOUNTER — PREP FOR PROCEDURE (OUTPATIENT)
Dept: GENERAL RADIOLOGY | Age: 57
End: 2022-03-16

## 2022-03-16 RX ORDER — SODIUM CHLORIDE 9 MG/ML
INJECTION, SOLUTION INTRAVENOUS CONTINUOUS
Status: CANCELLED | OUTPATIENT
Start: 2022-03-16

## 2022-03-17 ENCOUNTER — HOSPITAL ENCOUNTER (OUTPATIENT)
Dept: PHYSICAL THERAPY | Age: 57
Setting detail: THERAPIES SERIES
End: 2022-03-17
Payer: COMMERCIAL

## 2022-03-17 ENCOUNTER — HOSPITAL ENCOUNTER (OUTPATIENT)
Dept: CT IMAGING | Age: 57
Discharge: HOME | End: 2022-03-19
Payer: COMMERCIAL

## 2022-03-17 ENCOUNTER — HOSPITAL ENCOUNTER (OUTPATIENT)
Dept: INTERVENTIONAL RADIOLOGY/VASCULAR | Age: 57
Discharge: HOME | End: 2022-03-19
Payer: COMMERCIAL

## 2022-03-17 VITALS
WEIGHT: 220 LBS | OXYGEN SATURATION: 100 % | TEMPERATURE: 97.6 F | HEART RATE: 67 BPM | BODY MASS INDEX: 31.5 KG/M2 | HEIGHT: 70 IN | SYSTOLIC BLOOD PRESSURE: 128 MMHG | DIASTOLIC BLOOD PRESSURE: 78 MMHG | RESPIRATION RATE: 18 BRPM

## 2022-03-17 DIAGNOSIS — M47.26 OTHER SPONDYLOSIS WITH RADICULOPATHY, LUMBAR REGION: ICD-10-CM

## 2022-03-17 LAB
INR BLD: 0.9
PARTIAL THROMBOPLASTIN TIME: 24.5 SEC (ref 20.5–30.5)
PLATELET # BLD: 301 K/UL (ref 138–453)
PROTHROMBIN TIME: 10.1 SEC (ref 9.1–12.3)

## 2022-03-17 PROCEDURE — 85730 THROMBOPLASTIN TIME PARTIAL: CPT

## 2022-03-17 PROCEDURE — 2580000003 HC RX 258: Performed by: PHYSICIAN ASSISTANT

## 2022-03-17 PROCEDURE — 7100000011 HC PHASE II RECOVERY - ADDTL 15 MIN

## 2022-03-17 PROCEDURE — 2709999900 HC NON-CHARGEABLE SUPPLY

## 2022-03-17 PROCEDURE — 85610 PROTHROMBIN TIME: CPT

## 2022-03-17 PROCEDURE — 72295 X-RAY OF LOWER SPINE DISK: CPT

## 2022-03-17 PROCEDURE — 6360000004 HC RX CONTRAST MEDICATION: Performed by: STUDENT IN AN ORGANIZED HEALTH CARE EDUCATION/TRAINING PROGRAM

## 2022-03-17 PROCEDURE — 85049 AUTOMATED PLATELET COUNT: CPT

## 2022-03-17 PROCEDURE — 72132 CT LUMBAR SPINE W/DYE: CPT

## 2022-03-17 PROCEDURE — 7100000010 HC PHASE II RECOVERY - FIRST 15 MIN

## 2022-03-17 PROCEDURE — 62290 NJX PX DISCOGRAPHY LUMBAR: CPT

## 2022-03-17 RX ORDER — SODIUM CHLORIDE 9 MG/ML
INJECTION, SOLUTION INTRAVENOUS CONTINUOUS
Status: DISCONTINUED | OUTPATIENT
Start: 2022-03-17 | End: 2022-03-20 | Stop reason: HOSPADM

## 2022-03-17 RX ORDER — ACETAMINOPHEN 325 MG/1
650 TABLET ORAL EVERY 4 HOURS PRN
Status: DISCONTINUED | OUTPATIENT
Start: 2022-03-17 | End: 2022-03-20 | Stop reason: HOSPADM

## 2022-03-17 RX ADMIN — SODIUM CHLORIDE: 9 INJECTION, SOLUTION INTRAVENOUS at 10:00

## 2022-03-17 RX ADMIN — IOPAMIDOL 6 ML: 408 INJECTION, SOLUTION INTRATHECAL at 12:19

## 2022-03-17 ASSESSMENT — PAIN DESCRIPTION - DESCRIPTORS
DESCRIPTORS: ACHING
DESCRIPTORS: ACHING

## 2022-03-17 ASSESSMENT — PAIN DESCRIPTION - LOCATION
LOCATION: BACK

## 2022-03-17 ASSESSMENT — PAIN SCALES - GENERAL
PAINLEVEL_OUTOF10: 4
PAINLEVEL_OUTOF10: 6
PAINLEVEL_OUTOF10: 4

## 2022-03-17 ASSESSMENT — PAIN DESCRIPTION - PAIN TYPE
TYPE: CHRONIC PAIN
TYPE: CHRONIC PAIN

## 2022-03-17 ASSESSMENT — PAIN DESCRIPTION - FREQUENCY: FREQUENCY: CONTINUOUS

## 2022-03-17 ASSESSMENT — PAIN - FUNCTIONAL ASSESSMENT: PAIN_FUNCTIONAL_ASSESSMENT: 0-10

## 2022-03-17 ASSESSMENT — PAIN DESCRIPTION - ORIENTATION: ORIENTATION: LOWER

## 2022-03-17 NOTE — H&P
History and Physical    Pt Name: Olena Olson  MRN: [id number]  YOB: 1965  Date of evaluation: 3/17/2022  Primary Care Physician: Chilango Disla MD    SUBJECTIVE:   History of Chief Complaint:    Olena Olson is a 55 y.o. male who is scheduled today for Discogram. Patient reports back pain over the last 1.5 years. 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 Patient reports radiculopathy to bilateral legs with numbness and tingling. He states he just started physical therapy. He denies any previous back surgery. Allergies  is allergic to amoxicillin and shrimp flavor. Medications  Prior to Admission medications    Medication Sig Start Date End Date Taking? Authorizing Provider   docusate sodium (COLACE) 100 MG capsule Take 1 capsule by mouth 2 times daily as needed 1/19/22   Historical Provider, MD   predniSONE (DELTASONE) 20 MG tablet Take 3 tablets PO daily for first 5 days, then take 2 tablets PO daily for next 5 days, then take 1 tablet PO daily for last 5 days 3/7/22 3/22/22  Radha Tipton DO   ibuprofen (ADVIL;MOTRIN) 600 MG tablet Take 1 tablet by mouth 3 times daily as needed for Pain 1/18/22   Chilango Disla MD     Past Medical History    has a past medical history of HTN (hypertension) and Spondylosis. Past Surgical History   has a past surgical history that includes Knee arthroscopy (Right) and Mandible fracture surgery. Social History   reports that he has been smoking cigarettes. He has a 48.00 pack-year smoking history. He has never used smokeless tobacco.   reports no history of alcohol use. reports current drug use. Drug: Marijuana Kwaku Meckel). Family History  No family status information on file. family history is not on file.     Review of Systems:  CONSTITUTIONAL:   negative for fevers, chills, fatigue and malaise    EYES:   negative for double vision, blurred vision and photophobia    HEENT: negative for tinnitus, epistaxis and sore throat     RESPIRATORY:   negative for cough, shortness of breath, wheezing     CARDIOVASCULAR:   negative for chest pain, palpitations, syncope, edema     GASTROINTESTINAL:   negative for nausea, vomiting     GENITOURINARY:   negative for incontinence     MUSCULOSKELETAL:   negative for neck pain, reports back pain with radiculopathy to bilateral legs. NEUROLOGICAL:   Negative for weakness and tingling  negative for headaches and dizziness     PSYCHIATRIC:   negative for anxiety       OBJECTIVE:   VITALS:  height is 5' 10\" (1.778 m) and weight is 220 lb (99.8 kg). His temperature is 96.4 °F (35.8 °C). His blood pressure is 143/101 (abnormal) and his pulse is 64. His oxygen saturation is 94%. CONSTITUTIONAL:alert & oriented x 3, no acute distress. Calm and pleasant. SKIN:  Warm and dry, no rashes to exposed areas of skin. HEAD:  Normocephalic, atraumatic. EYES: PERRL. EOMs intact. EARS:  Intact and equal bilaterally. No edema or thickening, without lumps, lesions, or discharge. Hearing grossly WNL. NOSE:  Nares patent. No rhinorrhea. MOUTH/THROAT:  Mucous membranes pink and moist, uvula midline, teeth appear to be intact. NECK: Supple, no lymphadenopathy. LUNGS: Respirations even and non-labored. Clear to auscultation bilaterally, no wheezes, rales, or rhonchi. CARDIOVASCULAR: Regular rate and rhythm, no murmurs/rubs/gallops. ABDOMEN: soft, non-tender and non-distended, bowel sounds active x 4. EXTREMITIES: No edema to bilateral lower extremities. No varicosities to bilateral lower extremities. NEUROLOGIC: CN II-XII are grossly intact. Gait not assessed. IMPRESSIONS:   Spondylosis with radiculopathy.    PLANS:   MARIO Monterroso CNP   Electronically signed 3/17/2022 at 10:19 AM

## 2022-03-17 NOTE — BRIEF OP NOTE
Brief Postoperative Note    Gumaro Winn  YOB: 1965  [id number]    Pre-operative Diagnosis: back pain    Post-operative Diagnosis: Same    Procedure: Discogram    Anesthesia: Local    Surgeons/Assistants: Evans Martinez MD    Estimated Blood Loss: less than 50     Complications: None    Specimens: Was Not Obtained    Findings: Successful 2 level discogram.  Full report in [facility]    Electronically signed by LUNA Goode on 3/17/2022 at 11:52 AM

## 2022-03-17 NOTE — PROGRESS NOTES
Dr. Aditi Sandoval notified of patient's shrimp allergy, his physical therapy appointment today and of his smoking cigarettes this morning.

## 2022-04-11 NOTE — DISCHARGE SUMMARY
[x] [facility]) [facility] &  Therapy  [address]  [phone]  F: [phone] [] [address]  [facility] [place] [address]   [address]  P: [phone]  F: [phone] [] [address] &  Therapy  [address]  P: [phone]  F: [phone] [] [address]  [facility]   [address]   [place]: [phone]  F: [phone]         Physical Therapy Discharge Note    Date: 2022      Patient: Kathleen Perez  : 1965  MRN: [id number]: Dr. Taco Fernando, DO                                   Insurance: [employer], 30 visits yearly  Medical Diagnosis: other spondylosis with radiculopathy, lumbar region     Rehab Codes: M 54.59, M 62.81, M 62.830, Z 91.81, R 29.3  Onset Date: 3/7/22                   Next 's appt. : 22  Total visits attended: 1  Cancels/No shows:   Date of initial visit: 3/14/22                Date of final visit: 3/14/22      Subjective:  Refer to initial evaluation. Objective:  Refer to initial evaluation. Assessment:  Refer to initial evaluation. Treatment to Date:  [] Therapeutic Exercise    [] Modalities:  [] Therapeutic Activity    [] Ultrasound  [] Electrical Stimulation  [] Gait Training     [] Massage       [x] Lumbar/Cervical Traction  [] Neuromuscular Re-education [] Cold/hotpack [] Iontophoresis: 4 mg/mL  [] Instruction in Home Exercise Program                     Dexamethasone Sodium  [] Manual Therapy             Phosphate 40-80 mAmin  [] Aquatic Therapy                   [] Vasocompression/    [] Other:             Game Ready    Discharge Status:     [] Pt to continue exercise/home instructions independently. [] Therapy interrupted due to:    [x] Pt has 2 or more no shows/cancels, is discontinued per our policy.  Patient canceled 3/17/22 because he had a discogram done. Patient never called to return to therapy. Discharge. [] Other:         Electronically signed by Elza Quick PT on 4/11/2022 at 11:24 AM      If you have any questions or concerns, please don't hesitate to call.   Thank you for your referral.

## 2022-04-11 NOTE — FLOWSHEET NOTE
[x] [facility]) - [facility] &  Therapy  [address]    [phone]  F: [phone]   [] [address] [facility]  [facility]   [address]  P: [phone]  F: [phone]  [] [place]  [address] [facility]  P: [phone]  F: [phone] [] [address]  P: [phone]  F: [phone]  [] [address]  [facility]   [address]   [place]: [phone]  F: [phone]   [] [place] [address] [facility] [address]  [place]: [phone]   F: [phone]     Physical Therapy Cancel/No Show note    Date: 2022 note not written in error for 3/17/22 appt  Patient: Saroj Starks  : 1965  MRN: [id number]    Cancels/No Shows to date:      For today's appointment patient:    [x]  Cancelled    [] Rescheduled appointment    [] No-show     Reason given by patient:    []  Patient ill    []  Conflicting appointment    [] No transportation      [] Conflict with work    [] No reason given    [] Weather related    [] COVID-19    [x] Other:      Comments:  Had a discogram done. [] Next appointment was confirmed    Note was not written in error by Kelly Garcia PTA for 3/17/22 appt.     Electronically signed by: Asuncion Almaraz PT

## 2022-04-25 ENCOUNTER — OFFICE VISIT (OUTPATIENT)
Dept: PAIN MANAGEMENT | Age: 57
End: 2022-04-25
Payer: COMMERCIAL

## 2022-04-25 VITALS — HEIGHT: 70 IN | WEIGHT: 220 LBS | BODY MASS INDEX: 31.5 KG/M2

## 2022-04-25 DIAGNOSIS — M54.17 LUMBOSACRAL NEURITIS: ICD-10-CM

## 2022-04-25 DIAGNOSIS — M47.817 LUMBOSACRAL SPONDYLOSIS WITHOUT MYELOPATHY: Primary | ICD-10-CM

## 2022-04-25 PROCEDURE — G8427 DOCREV CUR MEDS BY ELIG CLIN: HCPCS | Performed by: PAIN MEDICINE

## 2022-04-25 PROCEDURE — G8417 CALC BMI ABV UP PARAM F/U: HCPCS | Performed by: PAIN MEDICINE

## 2022-04-25 PROCEDURE — 4004F PT TOBACCO SCREEN RCVD TLK: CPT | Performed by: PAIN MEDICINE

## 2022-04-25 PROCEDURE — 3017F COLORECTAL CA SCREEN DOC REV: CPT | Performed by: PAIN MEDICINE

## 2022-04-25 PROCEDURE — 99204 OFFICE O/P NEW MOD 45 MIN: CPT | Performed by: PAIN MEDICINE

## 2022-04-25 ASSESSMENT — ENCOUNTER SYMPTOMS: BACK PAIN: 1

## 2022-04-25 NOTE — PROGRESS NOTES
HPI:     Back Pain  This is a chronic problem. The current episode started more than 1 year ago. The problem occurs constantly. The problem is unchanged. The pain is present in the lumbar spine and gluteal. The quality of the pain is described as stabbing, shooting, cramping, burning and aching. The pain radiates to the right thigh, left foot, left thigh, right foot, left knee and right knee. The pain is at a severity of 7/10. The pain is severe. The pain is the same all the time. The symptoms are aggravated by twisting, sitting and bending. Stiffness is present all day. Associated symptoms include leg pain, numbness, tingling and weakness. He has tried muscle relaxant, heat, ice and home exercises (PT) for the symptoms. The treatment provided no relief. Back pain on the legs. Has seen a surgeon who ordered discogram and epidurals. Physical therapy without benefit. He has had epidurals in the past with some benefit. MRI lumbar spine with degenerative changes and varying levels of stenosis    Patient denies any new neurological symptoms. No bowel or bladder incontinence, no weakness, and no falling. Review of OARRS does not show any aberrant prescription behavior. Past Medical History:   Diagnosis Date    HTN (hypertension)     no meds    Spondylosis        Past Surgical History:   Procedure Laterality Date    KNEE ARTHROSCOPY Right     x3    MANDIBLE FRACTURE SURGERY         Allergies   Allergen Reactions    Amoxicillin     Shrimp Flavor          Current Outpatient Medications:     docusate sodium (COLACE) 100 MG capsule, Take 1 capsule by mouth 2 times daily as needed, Disp: , Rfl:     ibuprofen (ADVIL;MOTRIN) 600 MG tablet, Take 1 tablet by mouth 3 times daily as needed for Pain, Disp: 90 tablet, Rfl: 0    No family history on file.     Social History     Socioeconomic History    Marital status: Unknown     Spouse name: Not on file    Number of children: Not on file    Years of education: Not on file    Highest education level: Not on file   Occupational History    Not on file   Tobacco Use    Smoking status: Current Every Day Smoker     Packs/day: 1.00     Years: 48.00     Pack years: 48.00     Types: Cigarettes    Smokeless tobacco: Never Used   Substance and Sexual Activity    Alcohol use: Never    Drug use: Yes     Types: Marijuana Neftali Lior)    Sexual activity: Not on file   Other Topics Concern    Not on file   Social History Narrative    Not on file     Social Determinants of Health     Financial Resource Strain:     Difficulty of Paying Living Expenses: Not on file   Food Insecurity:     Worried About Running Out of Food in the Last Year: Not on file    Maddie of Food in the Last Year: Not on file   Transportation Needs:     Lack of Transportation (Medical): Not on file    Lack of Transportation (Non-Medical): Not on file   Physical Activity:     Days of Exercise per Week: Not on file    Minutes of Exercise per Session: Not on file   Stress:     Feeling of Stress : Not on file   Social Connections:     Frequency of Communication with Friends and Family: Not on file    Frequency of Social Gatherings with Friends and Family: Not on file    Attends Mandaeism Services: Not on file    Active Member of [address] [place] [address] or Organizations: Not on file    Attends Club or Organization Meetings: Not on file    Marital Status: Not on file   Intimate Partner Violence:     Fear of Current or Ex-Partner: Not on file    Emotionally Abused: Not on file    Physically Abused: Not on file    Sexually Abused: Not on file   Housing Stability:     Unable to Pay for Housing in the Last Year: Not on file    Number of [place] in the Last Year: Not on file    Unstable Housing in the Last Year: Not on file       Review of Systems:  Review of Systems   Musculoskeletal: Positive for back pain. Neurological: Positive for numbness, tingling and weakness.        Physical Exam:  Ht 5' 10\" (1.778 m)   Wt 220 lb (99.8 kg)   BMI 31.57 kg/m²     Physical Exam  Constitutional:       Appearance: Normal appearance. Pulmonary:      Effort: Pulmonary effort is normal.   Neurological:      Mental Status: He is alert. Psychiatric:         Attention and Perception: Attention and perception normal.         Mood and Affect: Mood and affect normal.         Record/Diagnostics Review:    As above, I did independently review the imaging    Orders:  Orders Placed This Encounter   Procedures    DE NJX DX/THER SBST INTRLMNR LMBR/SAC W/IMG GDN       Assessment:  1. Lumbosacral spondylosis without myelopathy    2. Lumbosacral neuritis        Treatment Plan:  DISCUSSION: Treatment options discussed with patient and all questions answered to patient's satisfaction. OARRS Review: Reviewed and acceptable for medications prescribed. TREATMENT OPTIONS:     Discussed different treatment options including continued conservative care such as physical therapy, chiropractic care, acupuncture. Discussed different interventional options such as epidural steroids or medial branch blocks. Also discussed neuromodulation in the form of spinal cord stimulation. Also discussed surgical evaluation. Pain in the low back and legs continues despite conservative measures, may benefit from epidural steroid injections, we'll try the caudal approach x 2. Halle Castillo M.D. I have reviewed the chief complaint and history of present illness (including ROS and PFSH) and vital documentation by my staff and I agree with their documentation and have added where applicable.

## 2022-05-04 ENCOUNTER — OFFICE VISIT (OUTPATIENT)
Dept: FAMILY MEDICINE CLINIC | Age: 57
End: 2022-05-04
Payer: COMMERCIAL

## 2022-05-04 VITALS
HEIGHT: 70 IN | SYSTOLIC BLOOD PRESSURE: 115 MMHG | BODY MASS INDEX: 32.81 KG/M2 | DIASTOLIC BLOOD PRESSURE: 77 MMHG | HEART RATE: 72 BPM | WEIGHT: 229.2 LBS | TEMPERATURE: 97.2 F

## 2022-05-04 DIAGNOSIS — M54.16 LUMBAR RADICULOPATHY: Primary | ICD-10-CM

## 2022-05-04 DIAGNOSIS — Z87.891 PERSONAL HISTORY OF TOBACCO USE: ICD-10-CM

## 2022-05-04 DIAGNOSIS — Z76.0 MEDICATION REFILL: ICD-10-CM

## 2022-05-04 PROCEDURE — G8417 CALC BMI ABV UP PARAM F/U: HCPCS | Performed by: STUDENT IN AN ORGANIZED HEALTH CARE EDUCATION/TRAINING PROGRAM

## 2022-05-04 PROCEDURE — 3017F COLORECTAL CA SCREEN DOC REV: CPT | Performed by: STUDENT IN AN ORGANIZED HEALTH CARE EDUCATION/TRAINING PROGRAM

## 2022-05-04 PROCEDURE — G0296 VISIT TO DETERM LDCT ELIG: HCPCS | Performed by: STUDENT IN AN ORGANIZED HEALTH CARE EDUCATION/TRAINING PROGRAM

## 2022-05-04 PROCEDURE — G8427 DOCREV CUR MEDS BY ELIG CLIN: HCPCS | Performed by: STUDENT IN AN ORGANIZED HEALTH CARE EDUCATION/TRAINING PROGRAM

## 2022-05-04 PROCEDURE — 99213 OFFICE O/P EST LOW 20 MIN: CPT | Performed by: STUDENT IN AN ORGANIZED HEALTH CARE EDUCATION/TRAINING PROGRAM

## 2022-05-04 PROCEDURE — 4004F PT TOBACCO SCREEN RCVD TLK: CPT | Performed by: STUDENT IN AN ORGANIZED HEALTH CARE EDUCATION/TRAINING PROGRAM

## 2022-05-04 RX ORDER — DOCUSATE SODIUM 100 MG/1
100 CAPSULE, LIQUID FILLED ORAL 2 TIMES DAILY PRN
Qty: 30 CAPSULE | Refills: 0 | Status: SHIPPED | OUTPATIENT
Start: 2022-05-04

## 2022-05-04 RX ORDER — IBUPROFEN 600 MG/1
600 TABLET ORAL 3 TIMES DAILY PRN
Qty: 90 TABLET | Refills: 0 | Status: SHIPPED | OUTPATIENT
Start: 2022-05-04

## 2022-05-04 ASSESSMENT — ENCOUNTER SYMPTOMS
COUGH: 0
WHEEZING: 0
DIARRHEA: 0
NAUSEA: 0
BACK PAIN: 1
ABDOMINAL PAIN: 0
CHEST TIGHTNESS: 0
SHORTNESS OF BREATH: 0
CONSTIPATION: 0
COLOR CHANGE: 0

## 2022-05-04 NOTE — PROGRESS NOTES
Visit Information    Have you changed or started any medications since your last visit including any over-the-counter medicines, vitamins, or herbal medicines? no   Have you stopped taking any of your medications? Is so, why? -  no  Are you having any side effects from any of your medications? - no    Have you seen any other physician or provider since your last visit? yes - Ortho , PM. , Neuro , PT.,   Have you had any other diagnostic tests since your last visit? yes - IR Inj., CT Lumbar , MRI Lumbar . , xr- Lumbar Labs , MMA procedure    Have you been seen in the emergency room and/or had an admission in a hospital since we last saw you?  no   Have you had your routine dental cleaning in the past 6 months?  no     Do you have an active MyChart account? If no, what is the barrier?   Yes    Patient Care Team:  Vanessa Martinez MD as PCP - General (Emergency Medicine)    Medical History Review  Past Medical, Family, and Social History reviewed and does not contribute to the patient presenting condition    Health Maintenance   Topic Date Due    COVID-19 Vaccine (1) Never done    Pneumococcal 0-64 years Vaccine (1 - PCV) Never done    HIV screen  Never done    Hepatitis C screen  Never done    DTaP/Tdap/Td vaccine (1 - Tdap) Never done    Lipids  Never done    Colorectal Cancer Screen  Never done    Shingles vaccine (1 of 2) Never done    Low dose CT lung screening  Never done    Flu vaccine (Season Ended) 09/01/2022    A1C test (Diabetic or Prediabetic)  11/26/2022    Depression Screen  11/26/2022    Hepatitis A vaccine  Aged Out    Hepatitis B vaccine  Aged Out    Hib vaccine  Aged Out    Meningococcal (ACWY) vaccine  Aged Out

## 2022-05-04 NOTE — PROGRESS NOTES
Subjective:    Bridget rAmijo is a 65 y.o. male with  has a past medical history of HTN (hypertension) and Spondylosis. No family history on file. Presented tothe office today for:  Chief Complaint   Patient presents with    Back Pain     follow up    Results     Patient would like to go over his xrays , CT, MRI       HPI     Katya Alanis is a 81-year-old male with a PMH significant for lumbar radiculopathy. Patient is here today for follow-up lumbar radiculopathy. Per chart review, patient was recently seen by neurosurgery on 3/7/2022. Recommendations at that time were to continue physical therapy and possible epidural injections for lower back pain. In addition, patient also saw pain management on 4/25/2022. Recommendations were also to try corticosteroid injections. Patient currently denies any saddle anesthesia, urinary/fecal incontinence, or focal neurological deficits. Review of Systems   Constitutional: Negative for activity change, appetite change and fever. HENT: Negative for congestion. Respiratory: Negative for cough, chest tightness, shortness of breath and wheezing. Cardiovascular: Negative for chest pain. Gastrointestinal: Negative for abdominal pain, constipation, diarrhea and nausea. Genitourinary: Negative for difficulty urinating. Musculoskeletal: Positive for arthralgias and back pain. Skin: Negative for color change. Neurological: Positive for numbness. Negative for dizziness, weakness, light-headedness and headaches. Psychiatric/Behavioral: Negative for agitation and confusion.        Objective:    /77 (Site: Left Upper Arm, Position: Sitting, Cuff Size: Large Adult) Comment: machine  Pulse 72   Temp 97.2 °F (36.2 °C)   Ht 5' 10\" (1.778 m)   Wt 229 lb 3.2 oz (104 kg)   BMI 32.89 kg/m²    BP Readings from Last 3 Encounters:   05/04/22 115/77   03/17/22 128/78   03/07/22 129/89     Physical Exam  Constitutional:       General: He is not in acute distress. Appearance: Normal appearance. He is not ill-appearing. Cardiovascular:      Rate and Rhythm: Normal rate and regular rhythm. Heart sounds: No murmur heard. No gallop. Pulmonary:      Effort: Pulmonary effort is normal. No respiratory distress. Breath sounds: Normal breath sounds. No wheezing. Abdominal:      General: Bowel sounds are normal. There is no distension. Tenderness: There is no abdominal tenderness. Musculoskeletal:         General: No swelling or deformity. Comments: Mild TTP lumbar spine, decreased ROM 2/2 pain, sensation intact   Neurological:      General: No focal deficit present. Mental Status: He is alert and oriented to person, place, and time. Psychiatric:         Mood and Affect: Mood normal.         Thought Content: Thought content normal.           Lab Results   Component Value Date     03/17/2022    INR 0.9 03/17/2022    LABA1C 5.8 11/26/2021     No results found for: CALCIUM, PHOS  No results found for: LDLCALC, LDLCHOLESTEROL, LDLDIRECT    Assessment and Plan:    1. Lumbar radiculopathy  -Reviewed discogram with patient  -Continue physical therapy  -Patient opting for corticosteroid injections at this time  -Tentatively scheduled with pain management for corticosteroid injection on May 25  -Refill prescription for Motrin 600 mg 3 times daily as needed  - ibuprofen (ADVIL;MOTRIN) 600 MG tablet; Take 1 tablet by mouth 3 times daily as needed for Pain  Dispense: 90 tablet; Refill: 0    2. Personal history of tobacco use    - WA VISIT TO DISCUSS LUNG CA SCREEN W LDCT  - CT Lung Screen (Annual); Future    3. Medication refill    - docusate sodium (COLACE) 100 MG capsule; Take 1 capsule by mouth 2 times daily as needed for Constipation  Dispense: 30 capsule;  Refill: 0          Requested Prescriptions     Signed Prescriptions Disp Refills    ibuprofen (ADVIL;MOTRIN) 600 MG tablet 90 tablet 0     Sig: Take 1 tablet by mouth 3 times daily as needed for Pain    docusate sodium (COLACE) 100 MG capsule 30 capsule 0     Sig: Take 1 capsule by mouth 2 times daily as needed for Constipation       Medications Discontinued During This Encounter   Medication Reason    ibuprofen (ADVIL;MOTRIN) 600 MG tablet REORDER    docusate sodium (COLACE) 100 MG capsule REORDER       Return in about 3 months (around 8/4/2022). Kanwal Lyons received counseling on the following healthy behaviors:   Reviewed prior labs and health maintenance  Continue current medications, diet and exercise. Discussed use, benefit, and side effects of prescribed medications. Barriers to medication compliance addressed. Patient given educational materials - see patient instructions  Was a self-tracking handout given in paper form or via [employer]? No:     Requested Prescriptions     Signed Prescriptions Disp Refills    ibuprofen (ADVIL;MOTRIN) 600 MG tablet 90 tablet 0     Sig: Take 1 tablet by mouth 3 times daily as needed for Pain    docusate sodium (COLACE) 100 MG capsule 30 capsule 0     Sig: Take 1 capsule by mouth 2 times daily as needed for Constipation       All patient questions answered. Patient voiced understanding. Quality Measures    Body mass index is 32.89 kg/m². Elevated. Weight control planned discussed Healthy diet and regular exercise. BP: 115/77 (machine) Blood pressure is normal. Treatment plan consists of No treatment change needed.     No results found for: LDLCALC, LDLCHOLESTEROL, LDLDIRECT (goal LDL reduction with dx if diabetes is 50% LDL reduction)      PHQ Scores 11/26/2021   PHQ2 Score 2   PHQ9 Score 2     Interpretation of Total Score Depression Severity: 1-4 = Minimal depression, 5-9 = Mild depression, 10-14 = Moderate depression, 15-19 = Moderately severe depression, 20-27 = Severe depression        Low Dose CT (LDCT) Lung Screening criteria met:     Age 50-77(Medicare) or 50-80 (USPSTF)   Pack year smoking >20   Still smoking or less than 15 year since quit   No sign or symptoms of lung cancer   > 11 months since last LDCT     Risks and benefits of lung cancer screening with LDCT scans discussed:    Significance of positive screen - False-positive LDCT results often occur. 95% of all positive results do not lead to a diagnosis of cancer. Usually further imaging can resolve most false-positive results; however, some patients may require invasive procedures. Over diagnosis risk - 10% to 12% of screen-detected lung cancer cases are over diagnosedthat is, the cancer would not have been detected in the patient's lifetime without the screening. Need for follow up screens annually to continue lung cancer screening effectiveness     Risks associated with radiation from annual LDCT- Radiation exposure is about the same as for a mammogram, which is about 1/3 of the annual background radiation exposure from everyday life. Starting screening at age 54 is not likely to increase cancer risk from radiation exposure. Patients with comorbidities resulting in life expectancy of < 10 years, or that would preclude treatment of an abnormality identified on CT, should not be screened due to lack of benefit.     To obtain maximal benefit from this screening, smoking cessation and long-term abstinence from smoking is critical

## 2022-05-04 NOTE — PATIENT INSTRUCTIONS
Thank you for letting us take care of you today. We hope all your questions were addressed. If a question was overlooked or something else comes to mind after you return home, please contact a member of your Care Team listed below. Your Care Team at [place] [address] is Team #3  Cathy Curtis MD (Faculty)  Jean Marie Calle MD (Faculty  Chris Fernando MD (Resident)  Diane Gray (Resident)   Mary Solis MD (Resident)  Haider Marsh MD (Resident)  Sujatha Foster., YOHANA Maki., YOHANA Sprague., Cipriano Duong., Cornell Kaminski ([address] [place] [address])  [facility] (Clinical Practice Manager)  [facility] (Clinical Pharmacist)     Office phone number: [phone]    If you need to get in right away due to illness, please be advised we have \"Same Day\" appointments available Monday-Friday. Please call us at [phone] option #3 to schedule your \"Same Day\" appointment. Thank you for letting us take care of you today. We hope all your questions were addressed. If a question was overlooked or something else comes to mind after you return home, please contact a member of your Care Team listed below. Your Care Team at [place] [address] is Team #3  Cathy Curtis MD (Faculty)  Jean Marie Calle MD (Faculty  Chris Fernando MD (Resident)  Diane Gray (Resident)   Mary Solis MD (Resident)  Haider Marsh MD (Resident)  Sujatha Foster., YOHANA Maki., YOHANA Sprague., Cipriano Duong., Cornell Kaminski ([address] [place] [address])  Milana Joiner (Clinical Practice Manager)  [facility] (Clinical Pharmacist)     Office phone number: [phone]    If you need to get in right away due to illness, please be advised we have \"Same Day\" appointments available Monday-Friday. Please call us at [phone] option #3 to schedule your \"Same Day\" appointment. What is lung cancer screening?   Lung cancer screening is a way in which doctors check the lungs for early signs of cancer in people who have no symptoms of lung cancer. A low-dose CT scan uses much less radiation than a normal CT scan and shows a more detailed image of the lungs than a standard X-ray. The goal of lung cancer screening is to find cancer early, before it has a chance to grow, spread, or cause problems. One large study found that smokers who were screened with low-dose CT scans were less likely to die of lung cancer than those who were screened with standard X-ray. Below is a summary of the things you need to know regarding screening for lung cancer with low-dose computed tomography (LDCT). This is a screening program that involves routine annual screening with LDCT studies of the lung. The LDCTs are done using low-dose radiation that is not thought to increase your cancer risk. If you have other serious medical conditions (other cancers, congestive heart failure) that limit your life expectancy to less than 10 years, you should not undergo lung cancer screening with LDCT. The chance is 20%-60% that the LDCT result will show abnormalities. This would require additional testing which could include repeat imaging or even invasive procedures. Most (about 95%) of \"abnormal\" LDCT results are false in the sense that no lung cancer is ultimately found. Additionally, some (about 10%) of the cancers found would not affect your life expectancy, even if undetected and untreated. If you are still smoking, the single most important thing that you can do to reduce your risk of dying of lung cancer is to quit. For this screening to be covered by Medicare and most other insurers, strict criteria must be met. If you do not meet these criteria, but still wish to undergo LDCT testing, you will be required to sign a waiver indicating your willingness to pay for the scan.

## 2022-05-09 ENCOUNTER — TELEPHONE (OUTPATIENT)
Dept: ONCOLOGY | Age: 57
End: 2022-05-09

## 2022-05-13 ENCOUNTER — HOSPITAL ENCOUNTER (OUTPATIENT)
Dept: CT IMAGING | Age: 57
Discharge: HOME | End: 2022-05-15
Payer: COMMERCIAL

## 2022-05-13 DIAGNOSIS — Z87.891 PERSONAL HISTORY OF TOBACCO USE: ICD-10-CM

## 2022-05-13 PROCEDURE — 71271 CT THORAX LUNG CANCER SCR C-: CPT

## 2022-05-27 ENCOUNTER — TELEPHONE (OUTPATIENT)
Dept: NEUROSURGERY | Age: 57
End: 2022-05-27

## 2022-05-27 DIAGNOSIS — M47.26 OTHER SPONDYLOSIS WITH RADICULOPATHY, LUMBAR REGION: Primary | ICD-10-CM

## 2022-05-27 NOTE — TELEPHONE ENCOUNTER
Patient states he needs a new referral for PT as the office won't accept his old one. - only went 1 time previously

## 2022-06-03 ENCOUNTER — TELEMEDICINE (OUTPATIENT)
Dept: FAMILY MEDICINE CLINIC | Age: 57
End: 2022-06-03
Payer: COMMERCIAL

## 2022-06-03 DIAGNOSIS — J43.8 OTHER EMPHYSEMA (HCC): Primary | ICD-10-CM

## 2022-06-03 PROCEDURE — 99213 OFFICE O/P EST LOW 20 MIN: CPT | Performed by: STUDENT IN AN ORGANIZED HEALTH CARE EDUCATION/TRAINING PROGRAM

## 2022-06-03 RX ORDER — BUDESONIDE AND FORMOTEROL FUMARATE DIHYDRATE 80; 4.5 UG/1; UG/1
2 AEROSOL RESPIRATORY (INHALATION) 2 TIMES DAILY
Qty: 10.2 G | Refills: 3 | Status: SHIPPED | OUTPATIENT
Start: 2022-06-03

## 2022-06-03 SDOH — ECONOMIC STABILITY: FOOD INSECURITY: WITHIN THE PAST 12 MONTHS, THE FOOD YOU BOUGHT JUST DIDN'T LAST AND YOU DIDN'T HAVE MONEY TO GET MORE.: NEVER TRUE

## 2022-06-03 SDOH — ECONOMIC STABILITY: FOOD INSECURITY: WITHIN THE PAST 12 MONTHS, YOU WORRIED THAT YOUR FOOD WOULD RUN OUT BEFORE YOU GOT MONEY TO BUY MORE.: NEVER TRUE

## 2022-06-03 ASSESSMENT — PATIENT HEALTH QUESTIONNAIRE - PHQ9
SUM OF ALL RESPONSES TO PHQ QUESTIONS 1-9: 2
1. LITTLE INTEREST OR PLEASURE IN DOING THINGS: 1
SUM OF ALL RESPONSES TO PHQ9 QUESTIONS 1 & 2: 2
DEPRESSION UNABLE TO ASSESS: PT REFUSES
2. FEELING DOWN, DEPRESSED OR HOPELESS: 1
SUM OF ALL RESPONSES TO PHQ QUESTIONS 1-9: 2

## 2022-06-03 ASSESSMENT — SOCIAL DETERMINANTS OF HEALTH (SDOH): HOW HARD IS IT FOR YOU TO PAY FOR THE VERY BASICS LIKE FOOD, HOUSING, MEDICAL CARE, AND HEATING?: NOT HARD AT ALL

## 2022-06-03 NOTE — PROGRESS NOTES
Attending Physician Statement  I  have discussed the care of Larry Arellano including pertinent history and exam findings with the resident. I agree with the assessment, plan and orders as documented by the resident. There were no vitals taken for this visit. BP Readings from Last 3 Encounters:   05/04/22 115/77   03/17/22 128/78   03/07/22 129/89     Wt Readings from Last 3 Encounters:   05/04/22 229 lb 3.2 oz (104 kg)   04/25/22 220 lb (99.8 kg)   03/17/22 220 lb (99.8 kg)          Diagnosis Orders   1.  Other emphysema ([facility] [address]  budesonide-formoterol (SYMBICORT) 80-4.5 MCG/ACT LOC Howard DO 6/3/2022 4:00 PM

## 2022-06-03 NOTE — PROGRESS NOTES
Lily Tolliver is a 65 y.o. 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 [facility])      Documentation:  I communicated with the patient and/or health care decision maker about     SOB      Details of this discussion including any medical advice provided:     Ongoing for many years  Not worse than usual  Has mornings with cough production  Denies fever, sweats, chest pain, dyspnea, hemoptysis  Needs form filled out so he can get [facility] in his home    Patient has not had any testing done  He has a 50 pack year smoking history  I will send symbicort for suspected COPD  Advised patient to FU in clinic to evaluate him for this and get PFT studies    Patient in agreement    No other concerns    Total Time: minutes: 21-30 minutes    Lily Tolliver was evaluated through a synchronous (real-time) audio encounter. Patient identification was verified at the start of the visit. He (or guardian if applicable) is aware that this is a billable service, which includes applicable co-pays. This visit was conducted with the patient's (and/or legal guardian's) verbal consent. He has not had a related appointment within my department in the past 7 days or scheduled within the next 24 hours. The patient was located at Home: [address] [place] [address]. The provider was located at [facility] (Appt Dept): [address] [place] [address]     Note: not billable if this call serves to triage the patient into an appointment for the relevant concern    Rocky Gonzalez MD

## 2022-06-03 NOTE — PROGRESS NOTES
Visit Information    Have you changed or started any medications since your last visit including any over-the-counter medicines, vitamins, or herbal medicines? no   Have you stopped taking any of your medications? Is so, why? -  no  Are you having any side effects from any of your medications? - no    Have you seen any other physician or provider since your last visit?  no   Have you had any other diagnostic tests since your last visit? YES- CT   Have you been seen in the emergency room and/or had an admission in a hospital since we last saw you?  no   Have you had your routine dental cleaning in the past 6 months?  no     Do you have an active MyChart account? If no, what is the barrier?   Yes    Patient Care Team:  Álvaro Beck MD as PCP - General (Emergency Medicine)    Medical History Review  Past Medical, Family, and Social History reviewed and does contribute to the patient presenting condition    Health Maintenance   Topic Date Due    COVID-19 Vaccine (1) Never done    Pneumococcal 0-64 years Vaccine (1 - PCV) Never done    HIV screen  Never done    Hepatitis C screen  Never done    DTaP/Tdap/Td vaccine (1 - Tdap) Never done    Lipids  Never done    Prostate Specific Antigen (PSA) Screening or Monitoring  Never done    Colorectal Cancer Screen  Never done    Shingles vaccine (1 of 2) Never done    Flu vaccine (Season Ended) 09/01/2022    A1C test (Diabetic or Prediabetic)  11/26/2022    Depression Screen  11/26/2022    Low dose CT lung screening  05/13/2023    Hepatitis A vaccine  Aged Out    Hepatitis B vaccine  Aged Out    Hib vaccine  Aged Out    Meningococcal (ACWY) vaccine  Aged Out

## 2022-06-03 NOTE — PATIENT INSTRUCTIONS
Thank you for letting us take care of you today. We hope all your questions were addressed. If a question was overlooked or something else comes to mind after you return home, please contact a member of your Care Team listed below. Your Care Team at [place] [address] is Team #3  Anastasia Garcia MD (Faculty)  Kassi Cormier MD (Faculty  Kimberlynmontez Samayoa MD (Resident)  Alexandria Adkins (Resident)   Lesley Rodgers MD (Resident)  Sol Ott MD (Resident)  Carmen Liz., YOHANA Martin., YOHANA Graves., Alyssa Gold., Mindi Real ([facility])  Ramona Young, [facility] (Clinical Practice Manager)  Chantal Buckner, [facility] (Clinical Pharmacist)     Office phone number: [phone]    If you need to get in right away due to illness, please be advised we have \"Same Day\" appointments available Monday-Friday. Please call us at [phone] option #3 to schedule your \"Same Day\" appointment.

## 2022-06-06 ENCOUNTER — HOSPITAL ENCOUNTER (OUTPATIENT)
Dept: PHYSICAL THERAPY | Age: 57
Setting detail: THERAPIES SERIES
Discharge: HOME | End: 2022-06-06
Payer: COMMERCIAL

## 2022-06-06 PROCEDURE — 97110 THERAPEUTIC EXERCISES: CPT

## 2022-06-06 PROCEDURE — 97162 PT EVAL MOD COMPLEX 30 MIN: CPT

## 2022-06-06 NOTE — CONSULTS
[x] [facility]) [facility] &  Therapy  [address]  [phone]  F: [phone]         Physical Therapy Spine Evaluation    Date:  2022  Patient: Sherley Estes  : 1965  MRN: [id number]  Physician: MARIO Piedra - CNP     Insurance: [employer] (limit 29 Rx)    Medical Diagnosis: spondylosis with radiculopathy, lumbar region M47.26     Rehab Codes: M54.59, M79.604, M79.605, R26.89, R26.81, R29.3, M62.81  Onset Date: Aug 01, 2020  Next 's appt. :  PCP,  Pn Mgmt    Subjective:   HPI/CC: Pt reports inJune  legs would \"stop working\" and he would fall, got worse that august, fell multiple times found to be caused by back. Pt had discogram 3/17/2022, reports increased pain after. Pt reports top few discs have bone spurs. Back hurts all the time. Gets burst of pain B toes, feet, legs that causing Pt to fall, R LE more often than L. Pt has fallen down steps in past, recently fell getting out of bed. Pt also gets numbness tingling B feet; R lat thigh gets numb and cold. Pain increases w/bending over, sitting, picking stuff up from floor, donning socks, up to 10/10. Pt only able to tolerate sitting 10-15 min. Pt avoids shoes that need tying due to pain. Pt unable to  one place very long. Pain decreases w/walking. Pt has used ice and heat in past, varied which one helped, does not have access to currently. Pt trying to avoid surgery. Pt had PT and epidurals in past in w/relief. Pt hoping to have epidurals, needs to complete PT prior to.       PMHx: [] Unremarkable [] Diabetes [x] HTN  [] Pacemaker   [] MI/Heart Problems [] Cancer [x] Arthritis   [x] Other: R knee scope x3, mandible fracture surgery, anxiety, panic attacks, depression, skull fracture 2019-in coma 1.5 months, memory issues               [x] Refer to full medical chart  In EPIC       Comorbidities:   [] Obesity [] Dialysis  [] N/A   [] Asthma/COPD [] Dementia [] Other: [] Stroke [] Sleep apnea [] Other:   [] Vascular disease [] Rheumatic disease [] Other:     Tests:  [x]? X-Ray: 2/14/22       [x]? MRI: 1/27/22             1. Severe L5-S1 degenerative disc height loss and desiccation with associated   2 mm degenerative retrolisthesis of L5 on S1.   2. Spinal canal stenoses, moderate at L4-5 and mild at L2-3, L3-4, and L5-S1.   3. Severe bilateral L5 neural foraminal narrowing.            Medications: [x] Refer to full medical record [] None [] Other:  Allergies:      [x] Refer to full medical record [] None [] Other:    Function:  Hand Dominance  [x] Right  [] Left  Patient lives with: Alone    In what type of home [x]  One story-Apartment    [] Two story   [] Split level   Number of stairs to enter -none-   With handrail on the []  Right to enter   [] Left to enter   Bathroom has a []  Tub only  [x] Tub/shower combo   [] Walk in shower    []  Grab bars   Washing machine is on []  Main level   [] Second level   [] Basement   Employer Not employed   Job Status []  Normal duty   [] Light duty   [] Off due to condition    []  Retired   [] Not employed   [x] Disability  [] Other:  []  Return to work:    Work activities/duties Applying for disability       ADL/IADL Previous level of function Current level of function Who currently assists the patient with task   Bathing  [x] Independent  [] Assist [x] Independent  [] Assist    Dress/grooming [x] Independent  [] Assist [x] Independent  [] Assist    Transfer/mobility [x] Independent  [] Assist [x] Independent  [] Assist Painful in/out of tub, car-SUV is better than low car   Feeding [x] Independent  [] Assist [x] Independent  [] Assist    Toileting [x] Independent  [] Assist [x] Independent  [] Assist    Driving [x] Independent  [] Assist [] Independent  [x] Assist Riding bothers back, does not have vehicle currently   Housekeeping [x] Independent  [] Assist [x] Independent  [] Assist    Grocery shop/meal prep [x] Independent  [] Assist [x] Independent  [] Assist      Gait Prior level of function Current level of function    [x] Independent  [] Assist [x] Independent  [] Assist   Device: [x] Independent [x] Independent    [] Straight Cane [] Quad cane [] Straight Cane [] Quad cane    [] Standard walker [] Rolling walker   [] 4 wheeled walker [] Standard walker [] Rolling walker   [] 4 wheeled walker    [] Wheelchair [] Wheelchair     Pain:  [x] Yes  [] No Location: Back, R toes, R foot, L knee Pain Rating: (0-10 scale) 6/10  Pain altered Tx:  [] Yes  [x] No  Action:    Symptoms:  [] Improving [x] Worsening [] Same    Sleep: [] OK    [x] Disturbed-lays on side, varies which side is more comfortable     Objective:      STRENGTH  STRENGTH  ROM    Left Right  Left Right Cervical    C5 Shld Abd   L1-2 Hip Flex 4-p 3+p Flexion    Shld Flexion   Hip Abd   Extension    Shld IR   L3-4 Knee Ext 3+p 3+p Rotation L R   Shld ER   L4 Ankle DF 3+ 3+ Sidebend L R   C6 Elb Flex   L5 EHL   Retraction    C7 Elb Ext   S1 Plant. Flex   Lumbar    C8 EPL   Abdominals   Flexion 44°p \"pinches, squeezes\"   T1 Fing Abd   Erector Spinae   Extension 18°p         Rotation L  R         Sidebend L R         UE/LE                                                            p=pain  Lumbar ext more painful than flex  Resisted knee ext B causes pressure in LB, R greater than L  Hip flex MMT discomfort in back       TESTS (+/-) LEFT RIGHT Not Tested   SLR [] sit [] supine   []   Hamstring (SLR)   []   SKTC -* + []   DKTC + + []   LTR  + pulling LB - []   SI JT   []   LAUREL   []   Joint Mobility   []   Cerv. Comp   []   Cerv. Distraction   []   Cerv. Alar/Transverse   []   Vertebral Artery   []   Adsons   []   Chantal Lemme   []   Vinay Tests ? Pain ?  Pain No Change Not Tested   RFIS [] [] [] [x]   PEARL [] [] [] [x]   RFIL [] [] [] [x]   REIL [] [] [] [x]   Rep Prot [] [] [] [x]   Rep Retract [] [] [] [x]   Hooklying-same as standing, 4-5/10 LB  *SKTC L decreases LBP  Pt reports does not tolerate laying prone    OBSERVATION No Deficit Deficit Not Tested Comments   Posture       Forward Head [] [] []    Rounded Shoulders [] [] []    Kyphosis [] [] []    Lordosis [] [] []    Lateral Shift [] [] []    Scoliosis [] [] []    Iliac Crest [] [] []    PSIS [] [] []    ASIS [] [] []    Genu Valgus [] [] []    Genu Varus [] [] []    Genu Recurvatum [] [] []    Pronation [] [] []    Supination [] [] []    Leg Length Discrp [] [] []    Slumped Sitting [] [] []    Palpation [] [] [x]    Sensation [] [] [x] numbness tingling B feet; R lat thigh gets numb and cold. Edema [] [] [x]    Neurological [] [] [x]        Functional Test: Oswestry  Score: 72% functionally impaired     Comments:    Assessment:  Patient would benefit from skilled physical therapy services in order to: decrease pain LB, B LE's, increase lumbar ROM, increase strength B LE, lumbar spine , and improve tolerance to sitting. Problems:    [x] ? Pain: LB, B LE 6/10 this date, up to 10/10  [x] ? ROM: Lumbar flex, ext   [x] ? Strength: B LE, spinal stab muscles   [x] ? Function: Oswestry 72% loss of function   [x] Other:  ?Tolerance to sitting      STG: (to be met in 12 treatments)  1. ? Pain: LB, B LE 5/10 average pain, 7/10 at worst  2. ? ROM: Lumbar flex 55°, ext 25° w/minimal increased pain  3. ? Strength: B LE 4-/5, spinal stab muscles evidenced by Pt ability to perform beginning spinal stab ex program with minimal increased pain  4. ? Function: Oswestry 58% loss of function   5. Pt report able to sit 20 min without increased pain  6. Patient to be independent with home exercise program as demonstrated by performance with correct form without cues. 7. Demonstrate Knowledge of fall prevention      Patient goals:  \"Getting approval for epidural and surgery\"    Rehab Potential:  [x] Good  [] Fair  [] Poor   Suggested Professional Referral:  [x] No  [] Yes:  Barriers to Goal Achievement:  [x] No  [] Yes:  Domestic Concerns:  [x] No  [] Yes: Pt. Education:  [x] Plans/Goals, Risks/Benefits discussed  [x] Home exercise program  Method of Education: [x] Verbal  [x] Demo  [] Written  Comprehension of Education:  [x] Verbalizes understanding. [] Demonstrates understanding. [] Needs Review. [] Demonstrates/verbalizes understanding of HEP/Ed previously given. Treatment Plan:  [x] Therapeutic Exercise   25500  [] Iontophoresis: 4 mg/mL Dexamethasone Sodium Phosphate  mAmin  37040   [x] Therapeutic Activity  70247 [] Vasopneumatic cold with compression  18656    [] Gait Training   99080 [] Ultrasound   41923   [x] Neuromuscular Re-education  56721 [x] Electrical Stimulation Unattended  52853   [x] Manual Therapy  88139 [] Electrical Stimulation Attended  80856   [x] Instruction in HEP  [x] Lumbar/Cervical Traction  39357   [] Aquatic Therapy   52003 [x] Cold/hotpack    [] Massage   70321      [] Dry Needling, 1 or 2 muscles  45339   [] Biofeedback, first 15 minutes   94502  [] Biofeedback, additional 15 minutes   74719 [] Dry Needling, 3 or more muscles  98716     []  Medication allergies reviewed for use of    Dexamethasone Sodium Phosphate 4mg/ml     with iontophoresis treatments. Pt is not allergic. Frequency:  2 x/week for 12 visits    Todays Treatment:  Modalities:   Precautions: Avoid sitting  Exercises:  Exercise Reps/ Time Weight/ Level Comments         SKTC L  10x  Discomfort    Bridges  10x  Painful L LB final reps    LTR 3x 5sec B, painful last rep         Other: Pt reports doing above ex at times at home. Reviewed ex w/Pt, instructed Pt to cont 2x daily. No further Rx this date due to Pt time constraint. Per prior PT note Pt reported traction decreased pain.        Specific Instructions for next treatment:progress spinal stab ex per Pt tolerance, focus on standing and supine ex, avoid sitting, trial HP ES, or CP ES to decrease pain, improve tolerance to ex, trial pelvic traction       Evaluation Complexity:  History (Personal factors, comorbidities) [] 0 [] 1-2 [x] 3+   Exam (limitations, restrictions) [] 1-2 [] 3 [x] 4+   Clinical presentation (progression) [] Stable [x] Evolving  [] Unstable   Decision Making [] Low [x] Moderate [] High    [] Low Complexity [x] Moderate Complexity [] High Complexity       Treatment Charges: Mins Units   [x] Evaluation       []  Low       [x]  Moderate       []  High 39 1   []  Modalities     [x]  Ther Exercise 9 1   []  Manual Therapy     []  Ther Activities     []  Aquatics     []  Vasocompression     []  Other       TOTAL TREATMENT TIME: 48 min    Time in: 1000      Time out: 1052    Electronically signed by: Porsche Smyth PT          Physician Signature:________________________________Date:__________________  By signing above or cosigning this note, I have reviewed this plan of care and certify a need for medically necessary rehabilitation services.      *PLEASE SIGN ABOVE AND FAX BACK ALL PAGES*

## 2022-06-06 NOTE — FLOWSHEET NOTE
Jared Fall Risk Assessment    Patient Name:  Barbara Pena  : 1965    Risk Factor Scale  Score   History of Falls [x] Yes  [] No 25  0 25   Secondary Diagnosis [] Yes  [x] No 15  0 0   Ambulatory Aid [] Furniture  [] Crutches/cane/walker  [x] None/bedrest/wheelchair/nurse 30  15  0 0   IV/Heparin Lock [] Yes  [x] No 20  0 0   Gait/Transferring [] Impaired  [x] Weak  [] Normal/bedrest/immobile 20  10  0 10   Mental Status [] Forgets limitations  [x] Oriented to own ability 15  0 0      Total: 35     Based on the Assessment score: check the appropriate box.     []  No intervention needed   Low =   Score of 0-24    [x]  Use standard prevention interventions Moderate =  Score of 24-44   [x] Give patient handout and discuss fall prevention strategies   [x] Establish goal of education for patient/family RE: fall prevention strategies    []  Use high risk prevention interventions High = Score of 45 and higher   [] Give patient handout and discuss fall prevention strategies   [] Establish goal of education for patient/family Re: fall prevention strategies   [] Discuss lifeline / other resources    Electronically signed by:   Griselda Kong PT  Date: 2022

## 2022-06-09 ENCOUNTER — HOSPITAL ENCOUNTER (OUTPATIENT)
Dept: PHYSICAL THERAPY | Age: 57
Setting detail: THERAPIES SERIES
Discharge: HOME | End: 2022-06-09
Payer: COMMERCIAL

## 2022-06-09 PROCEDURE — G0283 ELEC STIM OTHER THAN WOUND: HCPCS

## 2022-06-09 PROCEDURE — 97110 THERAPEUTIC EXERCISES: CPT

## 2022-06-09 NOTE — FLOWSHEET NOTE
Units   [x]  Modalities 20 1   [x]  Ther Exercise 35 2   []  Manual Therapy     []  Ther Activities     []  Aquatics     []  Vasocompression     []  Other     Total Treatment time 55 3       Assessment: [x] Progressing toward goals. Initiated standing exercises to increase LE strength with aggravation of pain. Difficulty with marches with pain in L knee. Visual shaking and muscle quivering throughout treatment suggestive of higher pain than patient is stating. Difficulty finding preferred position of supine or hooklying. Ended with trial of IFC/HP to low back to decrease pain for QOL. [] No change. [] Other:  [x] Patient would continue to benefit from skilled physical therapy services in order to:  decrease pain LB, B LE's, increase lumbar ROM, increase strength B LE, lumbar spine , and improve tolerance to sitting. Problems:    [x]? ? Pain: LB, B LE 6/10 this date, up to 10/10  [x]? ? ROM: Lumbar flex, ext   [x]? ? Strength: B LE, spinal stab muscles   [x]? ? Function: Oswestry 72% loss of function   [x]? Other:  ?Tolerance to sitting       STG: (to be met in 12 treatments)  1. ? Pain: LB, B LE 5/10 average pain, 7/10 at worst  2. ? ROM: Lumbar flex 55°, ext 25° w/minimal increased pain  3. ? Strength: B LE 4-/5, spinal stab muscles evidenced by Pt ability to perform beginning spinal stab ex program with minimal increased pain  4. ? Function: Oswestry 58% loss of function   5. Pt report able to sit 20 min without increased pain  6. Patient to be independent with home exercise program as demonstrated by performance with correct form without cues. 7. Demonstrate Knowledge of fall prevention        Patient goals: \"Getting approval for epidural and surgery\"     Rehab Potential:  [x]? Good  []? Fair  []? Poor    Suggested Professional Referral:  [x]? No  []? Yes:  Barriers to Goal Achievement:  [x]? No  []? Yes:  Domestic Concerns:  [x]? No  []?  Yes:    Pt. Education:  [x] Yes  [] No  [x] Reviewed Prior HEP/Ed  Method of Education: [x] Verbal  [x] Demo  [] Written  Comprehension of Education:  [x] Verbalizes understanding. [] Demonstrates understanding. [x] Needs review. Limited due to pain  [] Demonstrates/verbalizes HEP/Ed previously given. Plan: [x] Continue current frequency toward long and short term goals.     [x] Specific Instructions for subsequent treatments: progress spinal stab ex per Pt tolerance, focus on standing and supine ex, avoid sitting, trial HP ES, or CP ES to decrease pain, improve tolerance to ex, trial pelvic traction       Time In: 1435            Time Out: 1530    Electronically signed by:  Conchita Viera PTA

## 2022-06-13 ENCOUNTER — APPOINTMENT (OUTPATIENT)
Dept: PHYSICAL THERAPY | Age: 57
End: 2022-06-13
Payer: COMMERCIAL

## 2022-06-14 ENCOUNTER — HOSPITAL ENCOUNTER (OUTPATIENT)
Dept: PHYSICAL THERAPY | Age: 57
Setting detail: THERAPIES SERIES
Discharge: HOME | End: 2022-06-14
Payer: COMMERCIAL

## 2022-06-14 PROCEDURE — G0283 ELEC STIM OTHER THAN WOUND: HCPCS

## 2022-06-14 PROCEDURE — 97110 THERAPEUTIC EXERCISES: CPT

## 2022-06-14 NOTE — FLOWSHEET NOTE
[x] [facility] &  Therapy  [address]  [phone]  F: [phone] [] [address]  [address]   [address]  P: [phone]  F: [phone] [] [address]  [address]  P: [phone]  F: [phone] [] [address]  P: [phone]  F: [phone] [] [address]  [facility]   [address]   [place]: [phone]  F: [phone]      Physical Therapy Daily Treatment Note    Date:  2022  Patient Name:  Maya Sarmiento    :  1965  MRN: [id number]  Physician: MARIO Newell CNP                              Insurance: [employer] (limit 29 Rx)    Medical Diagnosis: spondylosis with radiculopathy, lumbar region M47.26                         Rehab Codes: M54.59, M79.604, M79.605, R26.89, R26.81, R29.3, M62.81  Onset Date: Aug 01, 2020                Next 's appt. :  PCP,  Pn Mgmt  Visit# / total visits: 3/12; Progress note for Medicare patient due at visit 10     Cancels/No Shows: 0/0    Subjective:    Pain:  [x] Yes  [] No  Location: Low back w/ radiculopathy  Pain Rating: (0-10 scale) 5/10  Pain altered Tx:  [x] No  [] Yes  Action:  Comments: Feels tense this date. Cancelled tomorrow's appointment on Monday secondary to inspections at apartment. Objective:  Modalities: IFC (opiate)/HP (large) low back in R side lying, HP held on with sheet tied around waist for 15 minutes after exercises.   Precautions:  Exercises:  Exercise Reps/ Time Weight/ Level Comments   Standing      Incline stretch 3x20\"     Heel raises   10x     Marches  10x     HS curls 10x                 Supine      SKTC  10x R/ 5x L      Bridges   10x     LTR   3x 5sec                      Other:      Treatment Charges: Mins Units   [x]  Modalities-HP/IFC 15/15 0/1   [x]  Ther Exercise 22 1   []  Manual Therapy     []  Ther Activities     []  Aquatics     []  Vasocompression     []  Other     Total Treatment time 37 mins        Assessment: [x] Progressing toward goals. Continued exercises per log this date with cues for exercise techniques and progressions. Patient completed supine exercises and then noted he needed to stand up. Stating bus ride here was bumpy and increases his pain and tenses his whole body. Reporting pain is all over left side of body after standing also. Hot pack and IFC in right sidelying to attempt to decrease pain with no relief per patient. [] No change. [] Other:  [x] Patient would continue to benefit from skilled physical therapy services in order to:  decrease pain LB, B LE's, increase lumbar ROM, increase strength B LE, lumbar spine , and improve tolerance to sitting. Problems:    [x]? ? Pain: LB, B LE 6/10 this date, up to 10/10  [x]? ? ROM: Lumbar flex, ext   [x]? ? Strength: B LE, spinal stab muscles   [x]? ? Function: Oswestry 72% loss of function   [x]? Other:  ?Tolerance to sitting       STG: (to be met in 12 treatments)  1. ? Pain: LB, B LE 5/10 average pain, 7/10 at worst  2. ? ROM: Lumbar flex 55°, ext 25° w/minimal increased pain  3. ? Strength: B LE 4-/5, spinal stab muscles evidenced by Pt ability to perform beginning spinal stab ex program with minimal increased pain  4. ? Function: Oswestry 58% loss of function   5. Pt report able to sit 20 min without increased pain  6. Patient to be independent with home exercise program as demonstrated by performance with correct form without cues. 7. Demonstrate Knowledge of fall prevention        Patient goals: \"Getting approval for epidural and surgery\"     Rehab Potential:  [x]? Good  []? Fair  []? Poor    Suggested Professional Referral:  [x]? No  []? Yes:  Barriers to Goal Achievement:  [x]? No  []? Yes:  Domestic Concerns:  [x]? No  []? Yes: Pt. Education:  [x] Yes  [] No  [x] Reviewed Prior HEP/Ed  Method of Education: [x] Verbal  [x] Demo  [] Written  Comprehension of Education:  [x] Verbalizes understanding. [] Demonstrates understanding. [x] Needs review. Limited due to pain  [] Demonstrates/verbalizes HEP/Ed previously given. Plan: [x] Continue current frequency toward long and short term goals.     [x] Specific Instructions for subsequent treatments: progress spinal stab ex per Pt tolerance, focus on standing and supine ex, avoid sitting, improve tolerance to ex, trial pelvic traction, try to progress if able      Time In: 1444            Time Out: 1521     Electronically signed by:  Earnest Gtz PTA none

## 2022-06-15 ENCOUNTER — APPOINTMENT (OUTPATIENT)
Dept: PHYSICAL THERAPY | Age: 57
End: 2022-06-15
Payer: COMMERCIAL

## 2022-06-20 ENCOUNTER — HOSPITAL ENCOUNTER (OUTPATIENT)
Dept: PHYSICAL THERAPY | Age: 57
Setting detail: THERAPIES SERIES
Discharge: HOME | End: 2022-06-20
Payer: COMMERCIAL

## 2022-06-20 PROCEDURE — 97110 THERAPEUTIC EXERCISES: CPT

## 2022-06-20 PROCEDURE — G0283 ELEC STIM OTHER THAN WOUND: HCPCS

## 2022-06-20 NOTE — FLOWSHEET NOTE
[x] [facility]) [facility] C[facility] &  Therapy  [address]  [phone]  F: [phone] [] [address]  [employer] [address]   [address]  P: [phone]  F: [phone] [] [address]  [address]  P: [phone]  F: [phone] [] [address]  P: [phone]  F: [phone] [] [address]  [facility]   [address]   [place]: [phone]  F: [phone]      Physical Therapy Daily Treatment Note    Date:  2022  Patient Name:  Bjorn Corbett    :  1965  MRN: [id number]  Physician: MARIO Chavarria CNP                              Insurance: "[employer] (limit 29 Rx)    Medical Diagnosis: spondylosis with radiculopathy, lumbar region M47.26                         Rehab Codes: M54.59, M79.604, M79.605, R26.89, R26.81, R29.3, M62.81  Onset Date: Aug 01, 2020                Next 's appt. :  PCP,  Pn Mgmt  Visit# / total visits: ; Progress note for Medicare patient due at visit 10     Cancels/No Shows: 0/0    Subjective:    Pain:  [x] Yes  [] No  Location: Low back w/ radiculopathy  Pain Rating: (0-10 scale) 1-2/10  Pain altered Tx:  [x] No  [] Yes  Action:  Comments: States he was able to sleep last night. Pain not too bad this date. Objective:  Modalities: IFC (opiate)/HP (large) low back in L side lying, HP held on with sheet tied around waist for 15 minutes after exercises.   Precautions:  Exercises:  Exercise Reps/ Time Weight/ Level Comments   Standing      Incline stretch  3x20\"     Heel raises    10x     Marches    10x     HS curls 10x     Hip abduction 10x  added   Hip extension 10x  added         Supine      SKTC  10x R/ 8x L      Bridges   5x     LTR    3x 5sec    SLR 5x  added Other:      Treatment Charges: Mins Units   [x]  Modalities-HP/IFC 15/15 0/1   [x]  Ther Exercise 26 2   []  Manual Therapy     []  Ther Activities     []  Aquatics     []  Vasocompression     []  Other     Total Treatment time 41 mins        Assessment: [x] Progressing toward goals. Added standing hip abduction and hip extension to increase LE and lumbar strength. Patient noted some \"shifting\" in low back with hip abduction. Also added SLR this date to increase core strength. Completed exercises as tolerance, fair tolerance this date. Patient completed 2 supine exercises and then needed to stand up and walk. Trial hot pack and IFC in left sidelying this date to see if more comfortable. At end of time, noted patient attempting to get up secondary to pain. [] No change. [] Other:  [x] Patient would continue to benefit from skilled physical therapy services in order to:  decrease pain LB, B LE's, increase lumbar ROM, increase strength B LE, lumbar spine , and improve tolerance to sitting. Problems:    [x]? ? Pain: LB, B LE 6/10 this date, up to 10/10  [x]? ? ROM: Lumbar flex, ext   [x]? ? Strength: B LE, spinal stab muscles   [x]? ? Function: Oswestry 72% loss of function   [x]? Other:  ?Tolerance to sitting       STG: (to be met in 12 treatments)  1. ? Pain: LB, B LE 5/10 average pain, 7/10 at worst  2. ? ROM: Lumbar flex 55°, ext 25° w/minimal increased pain  3. ? Strength: B LE 4-/5, spinal stab muscles evidenced by Pt ability to perform beginning spinal stab ex program with minimal increased pain  4. ? Function: Oswestry 58% loss of function   5. Pt report able to sit 20 min without increased pain  6. Patient to be independent with home exercise program as demonstrated by performance with correct form without cues. 7. Demonstrate Knowledge of fall prevention        Patient goals: \"Getting approval for epidural and surgery\"     Rehab Potential:  [x]? Good  []? Fair  []?  Poor    Suggested Professional Referral:  [x]? No  []? Yes:  Barriers to Goal Achievement:  [x]? No  []? Yes:  Domestic Concerns:  [x]? No  []? Yes:    Pt. Education:  [x] Yes  [] No  [x] Reviewed Prior HEP/Ed  Method of Education: [x] Verbal  [x] Demo  [] Written  Comprehension of Education:  [x] Verbalizes understanding. [] Demonstrates understanding. [x] Needs review. Limited due to pain  [] Demonstrates/verbalizes HEP/Ed previously given. Plan: [x] Continue current frequency toward long and short term goals.     [x] Specific Instructions for subsequent treatments: progress spinal stab ex per Pt tolerance, focus on standing and supine ex, avoid sitting, improve tolerance to ex, trial pelvic traction, try to progress if able      Time In: 1105            Time Out: 1147    Electronically signed by:  Giovanna Macias PTA

## 2022-06-23 ENCOUNTER — HOSPITAL ENCOUNTER (OUTPATIENT)
Dept: PHYSICAL THERAPY | Age: 57
Setting detail: THERAPIES SERIES
Discharge: HOME | End: 2022-06-23
Payer: COMMERCIAL

## 2022-06-23 NOTE — FLOWSHEET NOTE
[x] [facility]) - [facility] &  Therapy  [address]    [phone]  F: [phone]   [] [address]  [facility] [address]   [address]  P: [phone]  F: [phone]  [] [place]  [address]  P: [phone]  F: [phone] [] [address]  P: [phone]  F: [phone]  [] [address]  [facility]   [address]   [place]: [phone]  F: [phone]   [] [place] [address] [facility] [address]  [place]: [phone]   F: [phone]     Physical Therapy Cancel/No Show note    Date: 2022  Patient: Barbara Pena  : 1965  MRN: [id number]    Cancels/No Shows to date:     For today's appointment patient:    [x]  Cancelled    [] Rescheduled appointment    [] No-show     Reason given by patient:    []  Patient ill    []  Conflicting appointment    [] No transportation      [] Conflict with work    [] No reason given    [] Weather related    [] [id number]    [x] Other:      Comments:   [facility] last night and is in pain      [x] Next appointment was confirmed PREVIOUSLY    Electronically signed by: Yaya Denise PTA

## 2022-06-27 ENCOUNTER — HOSPITAL ENCOUNTER (OUTPATIENT)
Dept: PHYSICAL THERAPY | Age: 57
Setting detail: THERAPIES SERIES
Discharge: HOME | End: 2022-06-27
Payer: COMMERCIAL

## 2022-06-27 PROCEDURE — G0283 ELEC STIM OTHER THAN WOUND: HCPCS

## 2022-06-27 PROCEDURE — 97110 THERAPEUTIC EXERCISES: CPT

## 2022-06-27 NOTE — FLOWSHEET NOTE
[x] [facility]) [facility] &  Therapy  [address]  [phone]  F: [phone] [] [address]  [email or web address] [address]   [address]  P: [phone]  F: [phone] [] [address]  [address]  P: [phone]  F: [phone] [] [address]  P: [phone]  F: [phone] [] [address]  [facility]   [address]   [place]: [phone]  F: [phone]      Physical Therapy Daily Treatment Note    Date:  2022  Patient Name:  Lali Lima    :  1965  MRN: [id number]  Physician: MARIO Cardona CNP                              Insurance: [employer] (limit 29 Rx)    Medical Diagnosis: spondylosis with radiculopathy, lumbar region M47.26                         Rehab Codes: M54.59, M79.604, M79.605, R26.89, R26.81, R29.3, M62.81  Onset Date: Aug 01, 2020                Next 's appt. :  PCP,  Pn [facility]  Visit# / total visits: ; Progress note for Medicare patient due at visit 10     Cancels/No Shows: 1/0    Subjective:    Pain:  [x] Yes  [] No  Location: Low back w/ radiculopathy  Pain Rating: (0-10 scale) 6/10  Pain altered Tx:  [x] No  [] Yes  Action:  Comments: States his pain was 3/10 but after riding the bus, the pain is now 6/10. Was having lots of pain in his legs and feet. Feels like he wants to give up due to the pain. Pain management will not see him until he has completed PT. Only has 600 mg ibuprofen for the pain. Objective:  Modalities: IFC (opiate)/CP (large) low back in L side lying, CP wrapped on with sheet tied around waist for 20 minutes PRIOR to exercises.   Precautions:  Exercises:  Exercise Reps/ Time Weight/ Level Comments    Standing       Incline stretch  3x20\"   x   Heel raises 10x      Marches    10x      HS curls 3x   x   Hip abduction 10x      Hip extension 10x      Hamstring stretch 3x20\"  Added 6.27 x          Supine       SKTC  10x R/ 8x L       Bridges   5x      LTR    3x 5sec     SLR 5x  added    Prone transfer 1 attempt  Very painful x   LAQ 1x  Painful x   Other:  Additional time required for slow cautious movements due to pain      Treatment Charges: Mins Units   [x]  Modalities-CP/IFC 20/20 1   [x]  Ther Exercise 25 2   []  Manual Therapy     []  Ther Activities     []  Aquatics     []  Vasocompression     []  Other     Total Treatment time 45 mins 3       Assessment: [] Progressing toward goals. [x] No change. Patient presents pacing with high level of pain reported. Standing hamstring stretch added with clutched fist. Poor tolerance of light standing exercises. Trial of prone elicited outcry of pain with prone transfer from seated. No change in pain following opiate estim/CP. Felt good during estim but immediately regressed. Patient left with gingerly antalgic gait with difficulty advancing L leg due to the pain. [] Other:  [x] Patient would continue to benefit from skilled physical therapy services in order to:  decrease pain LB, B LE's, increase lumbar ROM, increase strength B LE, lumbar spine , and improve tolerance to sitting. Problems:    [x]? ? Pain: LB, B LE 6/10 this date, up to 10/10  [x]? ? ROM: Lumbar flex, ext   [x]? ? Strength: B LE, spinal stab muscles   [x]? ? Function: Oswestry 72% loss of function   [x]? Other:  ?Tolerance to sitting       STG: (to be met in 12 treatments)  1. ? Pain: LB, B LE 5/10 average pain, 7/10 at worst  2. ? ROM: Lumbar flex 55°, ext 25° w/minimal increased pain  3. ? Strength: B LE 4-/5, spinal stab muscles evidenced by Pt ability to perform beginning spinal stab ex program with minimal increased pain  4. ? Function: Oswestry 58% loss of function   5.  Pt report able to sit 20 min without increased pain  6. Patient to be independent with home exercise program as demonstrated by performance with correct form without cues. 7. Demonstrate Knowledge of fall prevention        Patient goals: \"Getting approval for epidural and surgery\"     Rehab Potential:  [x]? Good  []? Fair  []? Poor    Suggested Professional Referral:  [x]? No  []? Yes:  Barriers to Goal Achievement:  [x]? No  []? Yes:  Domestic Concerns:  [x]? No  []? Yes:    Pt. Education:  [x] Yes  [] No  [x] Reviewed Prior HEP/Ed  Method of Education: [x] Verbal  [x] Demo  [] Written  Comprehension of Education:  [x] Verbalizes understanding. [] Demonstrates understanding. [x] Needs review. Limited due to pain  [] Demonstrates/verbalizes HEP/Ed previously given. Plan: [x] Continue current frequency toward long and short term goals.     [x] Specific Instructions for subsequent treatments: progress spinal stab ex per Pt tolerance, focus on standing and supine ex, avoid sitting, improve tolerance to ex, trial pelvic traction, try to progress if able      Time In: 1100            Time Out: 1145    Electronically signed by:  Conchita Viera PTA

## 2022-06-30 ENCOUNTER — HOSPITAL ENCOUNTER (OUTPATIENT)
Dept: PHYSICAL THERAPY | Age: 57
Setting detail: THERAPIES SERIES
Discharge: HOME | End: 2022-06-30
Payer: COMMERCIAL

## 2022-06-30 PROCEDURE — 97110 THERAPEUTIC EXERCISES: CPT

## 2022-06-30 PROCEDURE — G0283 ELEC STIM OTHER THAN WOUND: HCPCS

## 2022-06-30 NOTE — PROGRESS NOTES
[x] [facility] &  Therapy  [address]  [phone]  F: [phone]         Physical Therapy Progress Note    Date: 2022      Patient: James Napier  : 1965  MRN: [id number]    Van Farfano, APRN - CNP                              Insurance: [facility] (limit 29 Rx)    Medical Diagnosis: spondylosis with radiculopathy, lumbar region M47.26                         Rehab Codes: M54.59, M79.604, M79.605, R26.89, R26.81, R29.3, M62.81  Onset Date: Aug 01, 2020                Next 's appt. :  PCP,  Pn Mgmt    Total visits attended: 6  Cancels/No shows:1/0  Date range of services: 2022 to 2022      Subjective:  Pain:  [x] Yes  [] No  Location: LB  Pain Rating: (0-10 scale) 5/10  Pain altered Tx:  [x] No  [] Yes  Action:  Comments: Pt reports no significant improvement since beginning PT, cont w/pain along L side of back, down L LE to foot. Riding bus to appts aggravates his pain. Pain at worst 9/10. Objective:  Test Measurements:   ROM DEGREES A/P STRENGTH  STRENGTH      LEFT RIGHT   HIP FLEX  3+p 3+p   HIPEXT            KNEE FLEX      KNEE EXT  4-p 3+         ANKLE DF  4- 4-         Lumbar Flex 39°p     Ext 10°p         Function: Pt able to sit 10 min or less then pain increases. Oswestry 68%loss of function. Assessment:  STG:(to be met in 12 treatments)  1. ? Pain: LB, B LE 5/10 average pain, 7/10 at worst -Min Progress Toward   2. ? ROM: Lumbar flex 55°, ext 25° w/minimal increased pain--No Change  3. ? Strength: B LE 4-/5, spinal stab muscles evidenced by Pt ability to perform beginning spinal stab ex program with minimal increased pain-Met for DF, L Knee ext, Min/No change in hips, spinal stab  4. ? Function: Oswestry 58% loss of function-Progress Toward   5. Pt report able to sit 20 min without increased pain-No Change  6.  Patient to be independent with home exercise program as demonstrated by performance with correct form without cues-Min Progress Toward   7. Demonstrate Knowledge of fall prevention        Treatment Plan:  [x] Therapeutic Exercise   07194  [] Iontophoresis: 4 mg/mL Dexamethasone Sodium Phosphate  mAmin  90481   [] Therapeutic Activity  46543 [] Vasopneumatic cold with compression  74835    [] Gait Training   27061 [] Ultrasound   96655   [] Neuromuscular Re-education  14668 [x] Electrical Stimulation Unattended  77793   [] Manual Therapy  25012 [] Electrical Stimulation Attended  35580   [x] Instruction in HEP  [] Lumbar/Cervical Traction  39917   [] Aquatic Therapy   96239 [x] Cold/hotpack    [] Massage   53357      [] Dry Needling, 1 or 2 muscles  28365   [] Biofeedback, first 15 minutes   65006  [] Biofeedback, additional 15 minutes   98432 [] Dry Needling, 3 or more muscles  21492       Patient Status:     [] Continue per initial plan of care. [] Additional visits necessary. [x] Other:Pt w/minimal progress since start of physical therapy. Recommend further evaluation by physician. Pt to see pain management 7/21/2022. Electronically signed by Emil Baltazar PT on 6/30/2022 at 9:39 AM        If you have any questions or concerns, please don't hesitate to call. Thank you for your referral.    Physician Signature:________________________________Date:__________________  By signing above or cosigning this note, I have reviewed this plan of care and certify a need for medically necessary rehabilitation services.      *PLEASE SIGN ABOVE AND FAX BACK ALL PAGES*

## 2022-06-30 NOTE — FLOWSHEET NOTE
[x] [facility]) [facility] CENTER &  Therapy  [address]  [phone]  F: [phone] [] [address]  [facility] [address]   [address]  P: [phone]  F: [phone] [] [address]  [address]  P: [phone]  F: [phone] [] [address]  P: [phone]  F: [phone] [] [address]  [facility]   [address]   [place]: [phone]  F: [phone]      Physical Therapy Daily Treatment Note    Date:  2022  Patient Name:  Audie Blood    :  1965  MRN: [id number]  Physician: MARIO Crespo CNP                              Insurance: [employer] (limit 29 Rx)    Medical Diagnosis: spondylosis with radiculopathy, lumbar region M47.26                         Rehab Codes: M54.59, M79.604, M79.605, R26.89, R26.81, R29.3, M62.81  Onset Date: Aug 01, 2020                Next 's appt. :  PCP,  Pn [facility]  Visit# / total visits: ; Progress note for Medicare patient due at visit 10     Cancels/No Shows: 1/0    Subjective:    Pain:  [x] Yes  [] No  Location: Low back w/ radiculopathy  Pain Rating: (0-10 scale) 5/10  Pain altered Tx:  [] No  [x] Yes  Action: Limited exercises this date  Comments: Pain along L side of back, down to the ball of his L foot. Objective:  Modalities: IFC (opiate)/CP (large) low back in L side lying, CP wrapped on with sheet tied around waist for 20 minutes .   Precautions:  Exercises:  Exercise Reps/ Time Weight/ Level Comments    Standing       Incline stretch  3x20\"      Heel raises    10x      Marches    10x      HS curls 3x      Hip abduction 10x      Hip extension 10x      Hamstring stretch 3x20\"  Added 6.27           Supine       SKTC  10x R/ 8x L       Bridges   5x      LTR    3x 5sec     SLR 5x  added    Prone transfer 1 attempt  Very painful    STS 2x   x   Transfer to SL  1x   x   Bed mobility 2x  Log rolling x   LAQ 1x  Painful    Other:  Additional time required for slow cautious movements due to pain      Treatment Charges: Mins Units   [x]  Modalities-CP/IFC 20/20 1   [x]  Ther Exercise 15 1   []  Manual Therapy     []  Ther Activities     []  Aquatics     []  Vasocompression     []  Other     Total Treatment time 35 mins 2       Assessment: [] Progressing toward goals. [x] No change. Patient paces around standing due to aggravation of pain. Notes pain has increased after sitting down for his assessment. Limited exercises with focus on pain control with IFC/CP to low back. Reviewed bed mobility and sit to side lying transfers. [] Other:  [x] Patient would continue to benefit from skilled physical therapy services in order to:  decrease pain LB, B LE's, increase lumbar ROM, increase strength B LE, lumbar spine , and improve tolerance to sitting. Problems:    [x]? ? Pain: LB, B LE 6/10 this date, up to 10/10  [x]? ? ROM: Lumbar flex, ext   [x]? ? Strength: B LE, spinal stab muscles   [x]? ? Function: Oswestry 72% loss of function   [x]? Other:  ?Tolerance to sitting       STG: (to be met in 12 treatments)  1. ? Pain: LB, B LE 5/10 average pain, 7/10 at worst  2. ? ROM: Lumbar flex 55°, ext 25° w/minimal increased pain  3. ? Strength: B LE 4-/5, spinal stab muscles evidenced by Pt ability to perform beginning spinal stab ex program with minimal increased pain  4. ? Function: Oswestry 58% loss of function   5. Pt report able to sit 20 min without increased pain  6. Patient to be independent with home exercise program as demonstrated by performance with correct form without cues. 7. Demonstrate Knowledge of fall prevention        Patient goals: \"Getting approval for epidural and surgery\"     Rehab Potential:  [x]? Good  []? Fair  []?  Poor    Suggested Professional Referral:  [x]? No  []? Yes:  Barriers to Goal Achievement:  [x]? No  []? Yes:  Domestic Concerns:  [x]? No  []? Yes:    Pt. Education:  [x] Yes  [] No  [] Reviewed Prior HEP/Ed  Method of Education: [x] Verbal  [x] Demo  [] Written  Comprehension of Education:  [x] Verbalizes understanding. [x] Demonstrates understanding. [x] Needs review. Limited due to pain  [] Demonstrates/verbalizes HEP/Ed previously given. Plan: [x] Continue current frequency toward long and short term goals.     [x] Specific Instructions for subsequent treatments: progress spinal stab ex per Pt tolerance, focus on standing and supine ex, avoid sitting, improve tolerance to ex, trial pelvic traction, try to progress if able      Time In: 1115            Time Out: 1145    Electronically signed by:  Jasvir Rosas PTA

## 2022-07-06 ENCOUNTER — HOSPITAL ENCOUNTER (OUTPATIENT)
Dept: PHYSICAL THERAPY | Age: 57
Setting detail: THERAPIES SERIES
End: 2022-07-06
Payer: COMMERCIAL

## 2022-07-08 ENCOUNTER — HOSPITAL ENCOUNTER (OUTPATIENT)
Dept: PHYSICAL THERAPY | Age: 57
Setting detail: THERAPIES SERIES
Discharge: HOME | End: 2022-07-08
Payer: COMMERCIAL

## 2022-07-08 NOTE — FLOWSHEET NOTE
[x] [facility]) - [facility] &  Therapy  [address]    [phone]  F: [phone]   [] [address] [facility] [address]   [address]  P: [phone]  F: [phone]  [] [address] &  Therapy  [address] [facility]  P: [phone]  F: [phone] [] [address]  P: [phone]  F: [phone]  [] [address]  [facility]   [address]   [place]: [phone]  F: [phone]   [] [place] [address] [facility] [address]  [place]: [phone]   F: [phone]     Physical Therapy Cancel/No Show note    Date: 2022  Patient: Karuna Larsen  : 1965  MRN: [id number]    Cancels/No Shows to date:     For today's appointment patient:    [x]  Cancelled    [] Rescheduled appointment    [] No-show     Reason given by patient:    []  Patient ill    []  Conflicting appointment    [x] No transportation      [] Conflict with work    [] No reason given    [] Weather related    [] [id number]    [] Other:      Comments:        [x] Next appointment was confirmed    Electronically signed by: Adam Arriola PTA

## 2022-07-13 ENCOUNTER — HOSPITAL ENCOUNTER (OUTPATIENT)
Dept: PHYSICAL THERAPY | Age: 57
Setting detail: THERAPIES SERIES
Discharge: HOME | End: 2022-07-13
Payer: COMMERCIAL

## 2022-07-13 PROCEDURE — G0283 ELEC STIM OTHER THAN WOUND: HCPCS

## 2022-07-13 PROCEDURE — 97110 THERAPEUTIC EXERCISES: CPT

## 2022-07-13 NOTE — FLOWSHEET NOTE
[x] [facility]  [address]  [phone]  F: [phone] [] [address]  [address]   [address]  P: [phone]  F: [phone] [] [address]  [address]  P: [phone]  F: [phone] [] [address]  P: [phone]  F: [phone] [] [address]  [facility]   [address]   [place]: [phone]  F: [phone]      Physical Therapy Daily Treatment Note    Date:  2022  Patient Name:  Abdi Lala    :  1965  MRN: [id number]  Physician: MARIO Noel - LC                              Insurance: [employer] (limit 29 Rx)    Medical Diagnosis: spondylosis with radiculopathy, lumbar region M47.26                         Rehab Codes: M54.59, M79.604, M79.605, R26.89, R26.81, R29.3, M62.81  Onset Date: Aug 01, 2020                Next 's appt. :  PCP,  Pn Mgmt  Visit# / total visits: ; Progress note for Medicare patient due at visit 10     Cancels/No Shows: 2/0    Subjective:    Pain:  [x] Yes  [] No  Location: Low back w/ radiculopathy  Pain Rating: (0-10 scale) 7/10  Pain altered Tx:  [] No  [x] Yes  Action: Limited exercises this date  Comments:   Patient reports he feels the pain is getting worse. Difficulty sleeping, sitting, getting comfortable. Objective:  Modalities: IFC (opiate)/ low back in standing x20 min .   Precautions:  Exercises:  Exercise Reps/ Time Weight/ Level Comments    Standing       Incline stretch  3x20\"   x   Heel raises    10x   x   Marches    10x      HS curls 3x      Hip abduction 10x      Hip extension 10x      Hamstring stretch 2x20\"  Inc pain x          Supine       SKTC  10x R/ 8x L       Bridges   5x      LTR    3x 5sec     SLR 5x added    Prone transfer 1 attempt  Very painful    STS 2x   x   Transfer to SL  2x   x   Bed mobility 2x  Log rolling SL to supine and back x   LAQ 1x  Painful    Other:  Additional time required for slow cautious movements due to pain      Treatment Charges: Mins Units   [x]  Modalities-CP/IFC 20/20 1   [x]  Ther Exercise 27 1   []  Manual Therapy     []  Ther Activities     []  Aquatics     []  Vasocompression     []  Other     Total Treatment time 47 mins 3       Assessment: [] Progressing toward goals. [x] No change. Started with gastroc and heel raises. Hamstring stretch with poor tolerance. Additional time spent for patient to find comfort with trial of side lying and supine. Shortly after, patient returned to standing position with weight shifts. Estim applied in standing this date due to poor positional preference. Patient has regressed in function with increased pain. Patient reports difficulty in bowel emptying that has recently worsened with the pain. Patient agreed to be placed on PT hold until follow up with pain management next week. [] Other:  [x] Patient would continue to benefit from skilled physical therapy services in order to:  decrease pain LB, B LE's, increase lumbar ROM, increase strength B LE, lumbar spine , and improve tolerance to sitting. Problems:    [x]? ? Pain: LB, B LE 6/10 this date, up to 10/10  [x]? ? ROM: Lumbar flex, ext   [x]? ? Strength: B LE, spinal stab muscles   [x]? ? Function: Oswestry 72% loss of function   [x]? Other:  ?Tolerance to sitting       STG: (to be met in 12 treatments)  1. ? Pain: LB, B LE 5/10 average pain, 7/10 at worst  2. ? ROM: Lumbar flex 55°, ext 25° w/minimal increased pain  3. ? Strength: B LE 4-/5, spinal stab muscles evidenced by Pt ability to perform beginning spinal stab ex program with minimal increased pain  4. ? Function: Oswestry 58% loss of function   5.  Pt report able to sit 20 min without increased pain  6. Patient to be independent with home exercise program as demonstrated by performance with correct form without cues. 7. Demonstrate Knowledge of fall prevention        Patient goals: \"Getting approval for epidural and surgery\"     Rehab Potential:  [x]? Good  []? Fair  []? Poor    Suggested Professional Referral:  [x]? No  []? Yes:  Barriers to Goal Achievement:  [x]? No  []? Yes:  Domestic Concerns:  []? No  []? Yes:    Pt. Education:  [x] Yes  [] No  [x] Reviewed Prior HEP/Ed  Method of Education: [x] Verbal  [x] Demo  [] Written  Comprehension of Education:  [x] Verbalizes understanding. [] Demonstrates understanding. [x] Needs review. Limited due to pain  [] Demonstrates/verbalizes HEP/Ed previously given. Plan: [x] Continue current frequency toward long and short term goals.     [x] Specific Instructions for subsequent treatments: progress spinal stab ex per Pt tolerance, focus on standing and supine ex, avoid sitting, improve tolerance to ex, trial pelvic traction, try to progress if able      Time In: 0950            Time Out: 1037    Electronically signed by:  Anh Quintana PTA

## 2022-07-19 ENCOUNTER — OFFICE VISIT (OUTPATIENT)
Dept: FAMILY MEDICINE CLINIC | Age: 57
End: 2022-07-19
Payer: COMMERCIAL

## 2022-07-19 VITALS
SYSTOLIC BLOOD PRESSURE: 137 MMHG | DIASTOLIC BLOOD PRESSURE: 82 MMHG | WEIGHT: 247 LBS | HEART RATE: 72 BPM | BODY MASS INDEX: 35.44 KG/M2

## 2022-07-19 DIAGNOSIS — J44.9 SUSPECTED CHRONIC OBSTRUCTIVE PULMONARY DISEASE BASED ON INITIAL EVALUATION (HCC): Primary | ICD-10-CM

## 2022-07-19 PROCEDURE — G8417 CALC BMI ABV UP PARAM F/U: HCPCS | Performed by: STUDENT IN AN ORGANIZED HEALTH CARE EDUCATION/TRAINING PROGRAM

## 2022-07-19 PROCEDURE — 99213 OFFICE O/P EST LOW 20 MIN: CPT | Performed by: STUDENT IN AN ORGANIZED HEALTH CARE EDUCATION/TRAINING PROGRAM

## 2022-07-19 PROCEDURE — 3023F SPIROM DOC REV: CPT | Performed by: STUDENT IN AN ORGANIZED HEALTH CARE EDUCATION/TRAINING PROGRAM

## 2022-07-19 PROCEDURE — G8427 DOCREV CUR MEDS BY ELIG CLIN: HCPCS | Performed by: STUDENT IN AN ORGANIZED HEALTH CARE EDUCATION/TRAINING PROGRAM

## 2022-07-19 PROCEDURE — 4004F PT TOBACCO SCREEN RCVD TLK: CPT | Performed by: STUDENT IN AN ORGANIZED HEALTH CARE EDUCATION/TRAINING PROGRAM

## 2022-07-19 PROCEDURE — 3017F COLORECTAL CA SCREEN DOC REV: CPT | Performed by: STUDENT IN AN ORGANIZED HEALTH CARE EDUCATION/TRAINING PROGRAM

## 2022-07-19 ASSESSMENT — ENCOUNTER SYMPTOMS
PHOTOPHOBIA: 0
APNEA: 0
BACK PAIN: 0
CONSTIPATION: 0
NAUSEA: 0
ABDOMINAL PAIN: 0
SHORTNESS OF BREATH: 0
VOMITING: 0
ABDOMINAL DISTENTION: 0
COUGH: 0
DIARRHEA: 0
WHEEZING: 0
COLOR CHANGE: 0

## 2022-07-19 NOTE — PROGRESS NOTES
Visit Information    Have you changed or started any medications since your last visit including any over-the-counter medicines, vitamins, or herbal medicines? no   Have you stopped taking any of your medications? Is so, why? -  no  Are you having any side effects from any of your medications? - no    Have you seen any other physician or provider since your last visit?  no   Have you had any other diagnostic tests since your last visit?  no   Have you been seen in the emergency room and/or had an admission in a hospital since we last saw you?  no   Have you had your routine dental cleaning in the past 6 months?  no     Do you have an active MyChart account? If no, what is the barrier?   Yes    Patient Care Team:  Juancarlos Zavaleta MD as PCP - General (Emergency Medicine)    Medical History Review  Past Medical, Family, and Social History reviewed and does not contribute to the patient presenting condition    Health Maintenance   Topic Date Due    COVID-19 Vaccine (1) Never done    Pneumococcal 0-64 years Vaccine (1 - PCV) Never done    HIV screen  Never done    Hepatitis C screen  Never done    DTaP/Tdap/Td vaccine (1 - Tdap) Never done    Lipids  Never done    Prostate Specific Antigen (PSA) Screening or Monitoring  Never done    Colorectal Cancer Screen  Never done    Shingles vaccine (1 of 2) Never done    Flu vaccine (1) 09/01/2022    A1C test (Diabetic or Prediabetic)  11/26/2022    Low dose CT lung screening  05/13/2023    Depression Screen  06/03/2023    Hepatitis A vaccine  Aged Out    Hepatitis B vaccine  Aged Out    Hib vaccine  Aged Out    Meningococcal (ACWY) vaccine  Aged Out

## 2022-07-19 NOTE — PROGRESS NOTES
Attending Physician Statement  I have discussed the care of Shira Villa 65 y.o. male, including pertinent history and exam findings, with the resident Dr. Dotty Whittaker MD.    History and Exam:   Chief Complaint   Patient presents with    COPD     Follow up       Past Medical History:   Diagnosis Date    HTN (hypertension)     no meds    Spondylosis      Allergies   Allergen Reactions    Amoxicillin     Shrimp Flavor       I have seen and examined the patient and the key elements of the encounter have been performed by me. BP Readings from Last 3 Encounters:   07/19/22 137/82   05/04/22 115/77   03/17/22 128/78     /82 (Site: Left Upper Arm, Position: Standing, Cuff Size: Large Adult)   Pulse 72   Wt 247 lb (112 kg)   BMI 35.44 kg/m²   Lab Results   Component Value Date     03/17/2022    INR 0.9 03/17/2022    LABA1C 5.8 11/26/2021     No results found for: LABPROT, LABALBU  No results found for: IRON, TIBC, FERRITIN  No results found for: LDLCALC, LDLCHOLESTEROL, LDLDIRECT  I agree with the assessment, plan and the diagnosis of    Diagnosis Orders   1. Suspected chronic obstructive pulmonary disease based on initial evaluation ([facility] [address]  Full PFT Study With Bronchodilator       . I agree with orders as documented by the resident. More than 25 minutes spent  in face to face encounter with the patient and more than half in counseling. Patient's questions were answered. Patient Voiced understanding to the counseling. Return in about 3 months (around 10/19/2022), or F/u suspected COPD.    (GC Modifier)-Dr. Rabia Bethea MD

## 2022-07-19 NOTE — PROGRESS NOTES
Subjective:    Agnel Moyer is a 65 y.o. male with  has a past medical history of HTN (hypertension) and Spondylosis. History reviewed. No pertinent family history. Presented tothe office today for:  Chief Complaint   Patient presents with    COPD     Follow up       HPI    Gianfranco Zepeda is a 23-year-old male with a PMH significant for lumbar radiculopathy. Patient is here today for suspected COPD follow-up. Suspected COPD: Per chart review, patient had a virtual visit on 6/3/2022. At the time, patient was prescribed Symbicort inhaler. Patient states inhaler has somewhat helped. Denies any sputum production. Denies fever, chills, wheezing, rhonchi, or rales. Review of Systems   Constitutional:  Negative for activity change, appetite change and fatigue. HENT:  Negative for congestion. Eyes:  Negative for photophobia and visual disturbance. Respiratory:  Negative for apnea, cough, shortness of breath and wheezing. Cardiovascular:  Negative for chest pain, palpitations and leg swelling. Gastrointestinal:  Negative for abdominal distention, abdominal pain, constipation, diarrhea, nausea and vomiting. Endocrine: Negative for polyuria. Genitourinary:  Negative for difficulty urinating and urgency. Musculoskeletal:  Negative for arthralgias and back pain. Skin:  Negative for color change. Neurological:  Negative for dizziness, syncope and headaches. Psychiatric/Behavioral:  Negative for agitation, behavioral problems, confusion, decreased concentration and dysphoric mood. Objective:    /82 (Site: Left Upper Arm, Position: Standing, Cuff Size: Large Adult)   Pulse 72   Wt 247 lb (112 kg)   BMI 35.44 kg/m²    BP Readings from Last 3 Encounters:   07/19/22 137/82   05/04/22 115/77   03/17/22 128/78     Physical Exam  Constitutional:       General: He is not in acute distress. Appearance: Normal appearance. He is not ill-appearing.    HENT:      Head: Normocephalic and atraumatic. Eyes:      Extraocular Movements: Extraocular movements intact. Cardiovascular:      Rate and Rhythm: Normal rate and regular rhythm. Pulses: Normal pulses. Pulmonary:      Effort: Pulmonary effort is normal. No respiratory distress. Breath sounds: No wheezing, rhonchi or rales. Abdominal:      General: Bowel sounds are normal. There is no distension. Palpations: Abdomen is soft. Tenderness: There is no abdominal tenderness. There is no guarding. Skin:     General: Skin is warm. Neurological:      General: No focal deficit present. Mental Status: He is alert and oriented to person, place, and time. Psychiatric:         Mood and Affect: Mood normal.         Lab Results   Component Value Date     03/17/2022    INR 0.9 03/17/2022    LABA1C 5.8 11/26/2021     No results found for: CALCIUM, PHOS  No results found for: LDLCALC, LDLCHOLESTEROL, LDLDIRECT    Assessment and Plan:    1. Suspected chronic obstructive pulmonary disease based on initial evaluation ([facility] [address]  -Continue Symbicort inhaler  -We will order full PFT study  -F/u 3 months  - Full PFT Study With Bronchodilator; Future          Requested Prescriptions      No prescriptions requested or ordered in this encounter       There are no discontinued medications. Return in about 3 months (around 10/19/2022). Radha Rinaldi received counseling on the following healthy behaviors:   Reviewed prior labs and health maintenance  Continue current medications, diet and exercise. Discussed use, benefit, and side effects of prescribed medications. Barriers to medication compliance addressed. Patient given educational materials - see patient instructions  Was a self-tracking handout given in paper form or via [employer]? No:     Requested Prescriptions      No prescriptions requested or ordered in this encounter       All patient questions answered. Patient voiced understanding.     Quality Measures    Body mass index is 35.44 kg/m². Elevated. Weight control planned discussed Healthy diet and regular exercise. BP: 137/82 Blood pressure is Normal. Treatment plan consists of No treatment change needed.     No results found for: LDLCALC, LDLCHOLESTEROL, LDLDIRECT (goal LDL reduction with dx if diabetes is 50% LDL reduction)      PHQ Scores 6/3/2022 11/26/2021   PHQ2 Score 2 2   PHQ9 Score 2 2     Interpretation of Total Score Depression Severity: 1-4 = Minimal depression, 5-9 = Mild depression, 10-14 = Moderate depression, 15-19 = Moderately severe depression, 20-27 = Severe depression

## 2022-07-21 ENCOUNTER — TELEMEDICINE (OUTPATIENT)
Dept: PAIN MANAGEMENT | Age: 57
End: 2022-07-21
Payer: COMMERCIAL

## 2022-07-21 DIAGNOSIS — M54.17 LUMBOSACRAL NEURITIS: Primary | ICD-10-CM

## 2022-07-21 PROCEDURE — 99213 OFFICE O/P EST LOW 20 MIN: CPT | Performed by: NURSE PRACTITIONER

## 2022-07-21 ASSESSMENT — ENCOUNTER SYMPTOMS
BACK PAIN: 1
COUGH: 0
CONSTIPATION: 0
SHORTNESS OF BREATH: 0

## 2022-07-21 NOTE — PROGRESS NOTES
Chief Complaint   Patient presents with    Back Pain         [facility]  Pt complains of low back pain. CT lumbar with degenerative changes. Caudal epidural ordered last visit but this was denied due to pt needing to complete 6 weeks of PT. He is currently in PT  - has completed about 8 sessions. He states he did not get any relief from the sessions and therapy was d/c. He would like to try to schedule the injections again. Back Pain  This is a chronic problem. The current episode started more than 1 year ago. The problem occurs constantly. The problem has been gradually worsening since onset. The pain is present in the lumbar spine and gluteal. The quality of the pain is described as aching, burning, cramping, shooting and stabbing. The pain radiates to the left thigh, left knee, right foot, right knee, right thigh and left foot. The pain is at a severity of 4/10. The pain is moderate. The pain is The same all the time. The symptoms are aggravated by bending, standing, twisting and sitting. Stiffness is present All day. Associated symptoms include numbness, tingling and weakness. Pertinent negatives include no bladder incontinence, chest pain or fever. He has tried bed rest, walking, NSAIDs, muscle relaxant, ice, heat, home exercises and analgesics for the symptoms. The treatment provided no relief. Patient denies any new neurological symptoms. No bowel or bladder incontinence, no weakness, and no falling.     Past Medical History:   Diagnosis Date    HTN (hypertension)     no meds    Spondylosis        Past Surgical History:   Procedure Laterality Date    KNEE ARTHROSCOPY Right     x3    MANDIBLE FRACTURE SURGERY         Allergies   Allergen Reactions    Amoxicillin     Shrimp Flavor          Current Outpatient Medications:     budesonide-formoterol (SYMBICORT) 80-4.5 MCG/ACT AERO, Inhale 2 puffs into the lungs 2 times daily, Disp: 10.2 g, Rfl: 3    ibuprofen (ADVIL;MOTRIN) 600 MG tablet, Take 1 tablet by mouth 3 times daily as needed for Pain, Disp: 90 tablet, Rfl: 0    docusate sodium (COLACE) 100 MG capsule, Take 1 capsule by mouth 2 times daily as needed for Constipation, Disp: 30 capsule, Rfl: 0    History reviewed. No pertinent family history. Social History     Socioeconomic History    Marital status:      Spouse name: Not on file    Number of children: Not on file    Years of education: Not on file    Highest education level: Not on file   Occupational History    Not on file   Tobacco Use    Smoking status: Every Day     Packs/day: 1.00     Years: 48.00     Pack years: 48.00     Types: Cigarettes     Start date: 1976    Smokeless tobacco: Never   Substance and Sexual Activity    Alcohol use: Never    Drug use: Yes     Types: Marijuana Stephani Amble)    Sexual activity: Not on file   Other Topics Concern    Not on file   Social History Narrative    Not on file     Social Determinants of Health     Financial Resource Strain: Low Risk     Difficulty of Paying Living Expenses: Not hard at all   Food Insecurity: No Food Insecurity    Worried About Running Out of Food in the Last Year: Never true    Ran Out of Food in the Last Year: Never true   Transportation Needs: Not on file   Physical Activity: Not on file   Stress: Not on file   Social Connections: Not on file   Intimate Partner Violence: Not on file   Housing Stability: Not on file       Review of Systems:  Review of Systems   Constitutional: Negative for chills and fever. Cardiovascular:  Negative for chest pain and palpitations. Respiratory:  Negative for cough and shortness of breath. Musculoskeletal:  Positive for back pain. Gastrointestinal:  Negative for constipation. Genitourinary:  Negative for bladder incontinence. Neurological:  Positive for numbness, tingling and weakness. Negative for disturbances in coordination and loss of balance. Physical Exam:  There were no vitals taken for this visit.     Physical Exam  HENT:

## 2022-08-17 NOTE — THERAPY DISCHARGE
[x] On license of [facility]  [address]  [phone]  F: [phone]         Physical Therapy Discharge Note    Date: 2022      Patient: Yohana Monreal  : 1965  MRN: [id number]    Physician: Claudell Gent, APRN - CNP                              Insurance: [employer] (limit 29 Rx)    Medical Diagnosis: spondylosis with radiculopathy, lumbar region M47.26                         Rehab Codes: M54.59, M79.604, M79.605, R26.89, R26.81, R29.3, M62.81  Onset Date: Aug 01, 2020                Next 's appt.:Unknown     Total visits attended:7  Cancels/No shows: 2/0  Date of initial visit: 2022                Date of final visit: 2022      Subjective:  Pain:  [x] Yes  [] No               Location: Low back w/ radiculopathy              Pain Rating: (0-10 scale) 7/10  Pain altered Tx:  [] No  [x] Yes  Action: Limited exercises this date  Comments: At final Rx Patient reports he feels the pain is getting worse. Difficulty sleeping, sitting, getting comfortable. Riding bus to PT appts aggravates his pain. Pt was to see PCP , and pn management , placed PT on hold until after saw .  No further contact with Pt since that time, no further orders received will discharge. Objective:  Test Measurements:   ROM DEGREES A/P STRENGTH  STRENGTH        LEFT RIGHT   HIP FLEX   3+p 3+p   HIPEXT                   KNEE FLEX         KNEE EXT   4-p 3+             ANKLE DF   4- 4-             Lumbar Flex 39°p       Ext 10°p          Function: Pt able to sit 10 min or less then pain increases. Oswestry 68% loss of function. Assessment:  STG:(to be met in 12 treatments)  ? Pain: LB, B LE 5/10 average pain, 7/10 at worst -Min Progress Toward   ? ROM: Lumbar flex 55°, ext 25° w/minimal increased pain--No Change  ? Strength:  B LE 4-/5, spinal stab muscles evidenced by Pt ability to perform beginning spinal stab ex program with minimal increased pain-Met for DF, L Knee ext, Min/No change in hips, spinal stab  ? Function: Oswestry 58% loss of function-Progress Toward   Pt report able to sit 20 min without increased pain-No Change  Patient to be independent with home exercise program as demonstrated by performance with correct form without cues-Min Progress Toward   Demonstrate Knowledge of fall prevention       Treatment to Date:  [x] Therapeutic Exercise    [] Modalities:  [] Therapeutic Activity    [] Ultrasound  [x] Electrical Stimulation  [] Gait Training     [] Massage       [] Lumbar/Cervical Traction  [] Neuromuscular Re-education [x] Cold/hotpack [] Iontophoresis: 4 mg/mL  [x] Instruction in Home Exercise Program                     Dexamethasone Sodium  [] Manual Therapy             Phosphate 40-80 mAmin  [] Aquatic Therapy                   [] Vasocompression/    [] Other:             Game Ready    Discharge Status:     [] Pt recovered from conditions. Treatment goals were met. [] Pt received maximum benefit. No further therapy indicated at this time. [] Pt to continue exercise/home instructions independently. [] Therapy interrupted due to:    [] Pt has 2 or more no shows/cancels, is discontinued per our policy. [] Pt has completed prescribed number of treatment sessions. [x] Other: Pt w/minimal progress since start of physical therapy, referred back to physician for further evaluation. .          Electronically signed by Lakshmi Mccrary PT on 8/17/2022 at 4:10 PM        If you have any questions or concerns, please don't hesitate to call.   Thank you for your referral.

## 2022-10-03 ENCOUNTER — TELEMEDICINE (OUTPATIENT)
Dept: FAMILY MEDICINE CLINIC | Age: 57
End: 2022-10-03
Payer: COMMERCIAL

## 2022-10-03 DIAGNOSIS — J44.9 SUSPECTED CHRONIC OBSTRUCTIVE PULMONARY DISEASE BASED ON INITIAL EVALUATION (HCC): Primary | ICD-10-CM

## 2022-10-03 PROCEDURE — 99423 OL DIG E/M SVC 21+ MIN: CPT | Performed by: STUDENT IN AN ORGANIZED HEALTH CARE EDUCATION/TRAINING PROGRAM

## 2022-10-03 NOTE — PROGRESS NOTES
Angelia Richmond is a 65 y.o. male evaluated via telephone on 10/3/2022 for No chief complaint on file. Carlos Gibbons is a 67-year-old male with a PMH significant for lumbosacral neuritis and suspected COPD. Patient is calling for suspected COPD follow-up. Per chart review, patient was last seen in the office on 7/19/2022. At the time, a full PFT study was ordered. It appears patient has yet to complete test.  He is currently on Symbicort inhaler. States he continues to smoke 1 PPD. He expresses no desire to quit smoking. He also deferred any Cologuard or any other screening tests at this time. Documentation:  I communicated with the patient and/or health care decision maker about Suspected COPD. Details of this discussion including any medical advice provided: Suspected COPD    1) Suspected COPD-encourage patient to complete PFT studies as soon as possible to exclude COPD. He denies any dyspnea on exertion, sputum production, or productive cough. Patient reports that he is not really using his inhaler. We will discuss in further detail on next visit. Total Time: minutes: 21-30 minutes    Angelia Richmond was evaluated through a synchronous (real-time) audio encounter. Patient identification was verified at the start of the visit. He (or guardian if applicable) is aware that this is a billable service, which includes applicable co-pays. This visit was conducted with the patient's (and/or legal guardian's) verbal consent. He has not had a related appointment within my department in the past 7 days or scheduled within the next 24 hours. The patient was located at Home: [address] [facility] [address]. The provider was located at [facility] (Appt Dept): [facility] [address].     Note: not billable if this call serves to triage the patient into an appointment for the relevant concern    Solomon Ivy MD   vis

## 2022-10-03 NOTE — PROGRESS NOTES
Visit Information    Have you changed or started any medications since your last visit including any over-the-counter medicines, vitamins, or herbal medicines? no   Have you stopped taking any of your medications? Is so, why? -  no  Are you having any side effects from any of your medications? - no    Have you seen any other physician or provider since your last visit?  no   Have you had any other diagnostic tests since your last visit?  no   Have you been seen in the emergency room and/or had an admission in a hospital since we last saw you?  no   Have you had your routine dental cleaning in the past 6 months?  no     Do you have an active MyChart account? If no, what is the barrier?   Yes    Patient Care Team:  Chris Lockwood MD as PCP - General (Emergency Medicine)    Medical History Review  Past Medical, Family, and Social History reviewed and does not contribute to the patient presenting condition    Health Maintenance   Topic Date Due    COVID-19 Vaccine (1) Never done    Pneumococcal 0-64 years Vaccine (1 - PCV) Never done    HIV screen  Never done    Hepatitis C screen  Never done    DTaP/Tdap/Td vaccine (1 - Tdap) Never done    Lipids  Never done    Colorectal Cancer Screen  Never done    Shingles vaccine (1 of 2) Never done    Flu vaccine (1) Never done    A1C test (Diabetic or Prediabetic)  11/26/2022    Low dose CT lung screening  05/13/2023    Depression Screen  06/03/2023    Hepatitis A vaccine  Aged Out    Hepatitis B vaccine  Aged Out    Hib vaccine  Aged Out    Meningococcal (ACWY) vaccine  Aged Out

## 2022-10-03 NOTE — PATIENT INSTRUCTIONS
Thank you for letting us take care of you today. We hope all your questions were addressed. If a question was overlooked or something else comes to mind after you return home, please contact a member of your Care Team listed below. Your Care Team at [place] [address] is Team #3  Stacy Feng MD (Faculty)  Miguel Penny MD (Faculty  Angelamanda Mcrae MD (Resident)  Jackie Bradshaw (Resident)   Gloria Shen MD (Resident)  Nahid Phillip., YOHANA Mg., YOHANA Landers., IRISN  Oumou Vega., Blanca Sims., Manuel Salinas ([address] [facility])  Nam Andrew, [facility] (Clinical Practice Manager)  Marisabel Montero [facility] (Clinical Pharmacist)     Office phone number: [phone]    If you need to get in right away due to illness, please be advised we have \"Same Day\" appointments available Monday-Friday. Please call us at [phone] option #3 to schedule your \"Same Day\" appointment.

## 2022-10-07 NOTE — PROGRESS NOTES
Attending Physician Statement  I  have discussed the care of Renate Stauffer including pertinent history and exam findings with the resident. I agree with the assessment, plan and orders as documented by the resident. There were no vitals taken for this visit. BP Readings from Last 3 Encounters:   07/19/22 137/82   05/04/22 115/77   03/17/22 128/78     Wt Readings from Last 3 Encounters:   07/19/22 247 lb (112 kg)   05/04/22 229 lb 3.2 oz (104 kg)   04/25/22 220 lb (99.8 kg)          Diagnosis Orders   1.  Suspected chronic obstructive pulmonary disease based on initial evaluation [facility])            Valentina Cannon DO 10/7/2022 3:58 PM

## 2023-02-23 ENCOUNTER — APPOINTMENT (OUTPATIENT)
Dept: CT IMAGING | Age: 58
End: 2023-02-23
Payer: MEDICARE

## 2023-02-23 ENCOUNTER — HOSPITAL ENCOUNTER (EMERGENCY)
Age: 58
Discharge: HOME | End: 2023-02-23
Attending: EMERGENCY MEDICINE
Payer: MEDICARE

## 2023-02-23 VITALS
DIASTOLIC BLOOD PRESSURE: 106 MMHG | HEART RATE: 88 BPM | WEIGHT: 220 LBS | HEIGHT: 69 IN | TEMPERATURE: 97.7 F | OXYGEN SATURATION: 94 % | BODY MASS INDEX: 32.58 KG/M2 | RESPIRATION RATE: 18 BRPM | SYSTOLIC BLOOD PRESSURE: 169 MMHG

## 2023-02-23 DIAGNOSIS — K11.8 PAROTID MASS: ICD-10-CM

## 2023-02-23 DIAGNOSIS — K04.7 DENTAL ABSCESS: Primary | ICD-10-CM

## 2023-02-23 LAB
ABSOLUTE EOS #: 0 K/UL (ref 0–0.4)
ABSOLUTE IMMATURE GRANULOCYTE: 0 K/UL (ref 0–0.3)
ABSOLUTE LYMPH #: 2.57 K/UL (ref 1–4.8)
ABSOLUTE MONO #: 1.71 K/UL (ref 0.1–0.8)
ANION GAP SERPL CALCULATED.3IONS-SCNC: 16 MMOL/L (ref 9–17)
BASOPHILS # BLD: 1 % (ref 0–2)
BASOPHILS ABSOLUTE: 0.17 K/UL (ref 0–0.2)
BUN SERPL-MCNC: 13 MG/DL (ref 6–20)
CALCIUM SERPL-MCNC: 9.6 MG/DL (ref 8.6–10.4)
CHLORIDE SERPL-SCNC: 101 MMOL/L (ref 98–107)
CO2 SERPL-SCNC: 21 MMOL/L (ref 20–31)
CREAT SERPL-MCNC: 0.86 MG/DL (ref 0.7–1.2)
EOSINOPHILS RELATIVE PERCENT: 0 % (ref 1–4)
GFR SERPL CREATININE-BSD FRML MDRD: >60 ML/MIN/1.73M2
GLUCOSE SERPL-MCNC: 137 MG/DL (ref 70–99)
HCT VFR BLD AUTO: 53.9 % (ref 40.7–50.3)
HGB BLD-MCNC: 18.3 G/DL (ref 13–17)
IMMATURE GRANULOCYTES: 0 %
LYMPHOCYTES # BLD: 15 % (ref 24–44)
MCH RBC QN AUTO: 31 PG (ref 25.2–33.5)
MCHC RBC AUTO-ENTMCNC: 34 G/DL (ref 28.4–34.8)
MCV RBC AUTO: 91.4 FL (ref 82.6–102.9)
MONOCYTES # BLD: 10 % (ref 1–7)
MORPHOLOGY: NORMAL
NRBC AUTOMATED: 0 PER 100 WBC
PDW BLD-RTO: 12.9 % (ref 11.8–14.4)
PLATELET # BLD AUTO: ABNORMAL K/UL (ref 138–453)
PLATELET, FLUORESCENCE: NORMAL K/UL (ref 138–453)
POTASSIUM SERPL-SCNC: 4 MMOL/L (ref 3.7–5.3)
RBC # BLD: 5.9 M/UL (ref 4.21–5.77)
REASON FOR REJECTION: NORMAL
SEG NEUTROPHILS: 74 % (ref 36–66)
SEGMENTED NEUTROPHILS ABSOLUTE COUNT: 12.65 K/UL (ref 1.8–7.7)
SODIUM SERPL-SCNC: 138 MMOL/L (ref 135–144)
WBC # BLD AUTO: 17.1 K/UL (ref 3.5–11.3)
ZZ NTE CLEAN UP: ORDERED TEST: NORMAL
ZZ NTE WITH NAME CLEAN UP: SPECIMEN SOURCE: NORMAL

## 2023-02-23 PROCEDURE — 80048 BASIC METABOLIC PNL TOTAL CA: CPT

## 2023-02-23 PROCEDURE — 85055 RETICULATED PLATELET ASSAY: CPT

## 2023-02-23 PROCEDURE — 6370000000 HC RX 637 (ALT 250 FOR IP): Performed by: STUDENT IN AN ORGANIZED HEALTH CARE EDUCATION/TRAINING PROGRAM

## 2023-02-23 PROCEDURE — 96374 THER/PROPH/DIAG INJ IV PUSH: CPT

## 2023-02-23 PROCEDURE — 6360000002 HC RX W HCPCS: Performed by: STUDENT IN AN ORGANIZED HEALTH CARE EDUCATION/TRAINING PROGRAM

## 2023-02-23 PROCEDURE — 99285 EMERGENCY DEPT VISIT HI MDM: CPT

## 2023-02-23 PROCEDURE — 85025 COMPLETE CBC W/AUTO DIFF WBC: CPT

## 2023-02-23 PROCEDURE — 70491 CT SOFT TISSUE NECK W/DYE: CPT

## 2023-02-23 PROCEDURE — 6360000004 HC RX CONTRAST MEDICATION: Performed by: STUDENT IN AN ORGANIZED HEALTH CARE EDUCATION/TRAINING PROGRAM

## 2023-02-23 PROCEDURE — 96375 TX/PRO/DX INJ NEW DRUG ADDON: CPT

## 2023-02-23 RX ORDER — KETOROLAC TROMETHAMINE 30 MG/ML
30 INJECTION, SOLUTION INTRAMUSCULAR; INTRAVENOUS ONCE
Status: COMPLETED | OUTPATIENT
Start: 2023-02-23 | End: 2023-02-23

## 2023-02-23 RX ORDER — CLINDAMYCIN HYDROCHLORIDE 150 MG/1
450 CAPSULE ORAL 3 TIMES DAILY
Qty: 63 CAPSULE | Refills: 0 | Status: SHIPPED | OUTPATIENT
Start: 2023-02-23 | End: 2023-03-02

## 2023-02-23 RX ORDER — FENTANYL CITRATE 50 UG/ML
50 INJECTION, SOLUTION INTRAMUSCULAR; INTRAVENOUS ONCE
Status: COMPLETED | OUTPATIENT
Start: 2023-02-23 | End: 2023-02-23

## 2023-02-23 RX ORDER — CLINDAMYCIN HYDROCHLORIDE 150 MG/1
300 CAPSULE ORAL ONCE
Status: COMPLETED | OUTPATIENT
Start: 2023-02-23 | End: 2023-02-23

## 2023-02-23 RX ADMIN — CLINDAMYCIN HYDROCHLORIDE 300 MG: 150 CAPSULE ORAL at 19:27

## 2023-02-23 RX ADMIN — FENTANYL CITRATE 50 MCG: 50 INJECTION, SOLUTION INTRAMUSCULAR; INTRAVENOUS at 18:24

## 2023-02-23 RX ADMIN — KETOROLAC TROMETHAMINE 30 MG: 30 INJECTION, SOLUTION INTRAMUSCULAR at 16:12

## 2023-02-23 RX ADMIN — IOPAMIDOL 75 ML: 755 INJECTION, SOLUTION INTRAVENOUS at 17:50

## 2023-02-23 ASSESSMENT — PAIN DESCRIPTION - ORIENTATION: ORIENTATION: LEFT

## 2023-02-23 ASSESSMENT — PAIN SCALES - GENERAL: PAINLEVEL_OUTOF10: 6

## 2023-02-23 ASSESSMENT — PAIN DESCRIPTION - LOCATION: LOCATION: TEETH

## 2023-02-23 ASSESSMENT — PAIN - FUNCTIONAL ASSESSMENT: PAIN_FUNCTIONAL_ASSESSMENT: 0-10

## 2023-02-23 NOTE — ED NOTES
Pt reports to the ED with complaints of dental pain x2 days. Pt states he started having pain yesterday and noticed the L side of his face started to swell today. Airway intact, pt denies difficulty breathing at this time. Upon assessment, redness and edema noted to L upper side of mouth. Pt denies having a dentist. Pt does not have any other complaints at this time. Pt is A&O x4 and speaking in complete sentences. Pt is resting in bed comfortably, NAD noted. Pt denies chest pain, SOB.  Will continue to monitor       Chi Ramirez RN  02/23/23 2569

## 2023-02-23 NOTE — ED NOTES
The following labs were labeled with appropriate pt sticker and tubed to lab:     [] Blue     [x] Lavender   [] on ice  [x] Green/yellow  [] Green/black [] on ice  [] Mouna Guadeloupe  [] on ice  [] Yellow  [] Red  [] Type/ Screen  [] ABG  [] VBG    [] COVID-19 swab    [] Rapid  [] PCR  [] Flu swab  [] Peds Viral Panel     [] Urine Sample  [] Fecal Sample  [] Pelvic Cultures  [] Blood Cultures  [] X 2  [] STREP Cultures         Blane Skinner RN  02/23/23 0357

## 2023-02-23 NOTE — ED NOTES
Writer and Verito RN attempted for IV access with no success.  Autumn Solomon to bedside with US to attempt for access     Meaghan Hassan RN  02/23/23 6549

## 2023-02-23 NOTE — ED PROVIDER NOTES
[facility]  Emergency Department  Faculty Attestation     I performed a history and physical examination of the patient and discussed management with the resident. I reviewed the residents note and agree with the documented findings and plan of care. Any areas of disagreement are noted on the chart. I was personally present for the key portions of any procedures. I have documented in the chart those procedures where I was not present during the key portions. I have reviewed the emergency nurses triage note. I agree with the chief complaint, past medical history, past surgical history, allergies, medications, social and family history as documented unless otherwise noted below. For Physician Assistant/ Nurse Practitioner cases/documentation I have personally evaluated this patient and have completed at least one if not all key elements of the E/M (history, physical exam, and MDM). Additional findings are as noted. Primary Care Physician:  Chiki Wilhelm MD    Screenings:  [unfilled]    CHIEF COMPLAINT       Chief Complaint   Patient presents with    Dental Pain     L upper    Facial Swelling     L side, denies SOB       RECENT VITALS:   Temp: 97.7 °F (36.5 °C),  Heart Rate: 88, Resp: 18, BP: (!) 169/106    LABS:  Labs Reviewed   CBC WITH AUTO DIFFERENTIAL   BASIC METABOLIC PANEL       Radiology  CT SOFT TISSUE NECK W CONTRAST    (Results Pending)         Attending Physician Additional  Notes    Patient has left upper tooth ache, temperature sensitivity, facial pain. No headache stiff neck or fevers. He has a metal plate in his mandible but no foreign bodies in his maxilla. On exam he has subtle left facial swelling with tenderness. Vital signs reveal hypertension other vital signs normal.  There is no definite drainable odontogenic abscess. He does have significant caries and gingival inflammation.   Plan is CT face, antibiotics, analgesics, dental follow-up, return precautions. Jose Alejandro Tang.  Mert Guajardo MD, [facility]  Attending Emergency  Physician                Sawyer Salazar MD  02/23/23 1713

## 2023-02-23 NOTE — ED PROVIDER NOTES
[facility] ED  Emergency Department Encounter  Emergency Medicine Resident     Pt Name:Lars Liang  MRN: [id number]  [place] 1965  Date of evaluation: 2/23/23  PCP:  Katy Villareal MD  Note Started: 3:27 PM EST      CHIEF COMPLAINT       Chief Complaint   Patient presents with    Dental Pain     L upper    Facial Swelling     L side, denies SOB       HISTORY OF PRESENT ILLNESS  (Location/Symptom, Timing/Onset, Context/Setting, Quality, Duration, Modifying Factors, Severity.)      Fifi Gomez is a 65 y.o. male who presents with 1 to 2 days of left-sided dental pain and left facial swelling. No chest pain or shortness of breath. He denies any fevers or chills associated with this. No difficulty swallowing but he has had difficulty eating and drinking due to severity of swelling and pain. He has had dental issues in the past but feels that the swelling is more than usual.  He has tried ice and salt water, also taking Motrin without much relief. No other complaints associate with this. PAST MEDICAL / SURGICAL / SOCIAL / FAMILY HISTORY      has a past medical history of HTN (hypertension) and Spondylosis. has a past surgical history that includes Knee arthroscopy (Right) and Mandible fracture surgery.       Social History     Socioeconomic History    Marital status:      Spouse name: Not on file    Number of children: Not on file    Years of education: Not on file    Highest education level: Not on file   Occupational History    Not on file   Tobacco Use    Smoking status: Every Day     Packs/day: 1.00     Years: 48.00     Pack years: 48.00     Types: Cigarettes     Start date: 1976    Smokeless tobacco: Never   Substance and Sexual Activity    Alcohol use: Never    Drug use: Yes     Types: Marijuana [facility] [place])    Sexual activity: Not on file   Other Topics Concern    Not on file   Social History Narrative    Not on file     Social Determinants of Health     Financial Resource Strain: Low Risk     Difficulty of Paying Living Expenses: Not hard at all   Food Insecurity: No Food Insecurity    Worried About Running Out of Food in the Last Year: Never true    Ran Out of Food in the Last Year: Never true   Transportation Needs: Not on file   Physical Activity: Not on file   Stress: Not on file   Social Connections: Not on file   Intimate Partner Violence: Not on file   Housing Stability: Not on file       History reviewed. No pertinent family history. Allergies:  Amoxicillin and Shrimp flavor    Home Medications:  Prior to Admission medications    Medication Sig Start Date End Date Taking? Authorizing Provider   clindamycin (CLEOCIN) 150 MG capsule Take 3 capsules by mouth 3 times daily for 7 days 2/23/23 3/2/23 Yes Jo Boudreaux DO   budesonide-formoterol (SYMBICORT) 80-4.5 MCG/ACT AERO Inhale 2 puffs into the lungs 2 times daily 6/3/22   Bebo Calhoun MD   ibuprofen (ADVIL;MOTRIN) 600 MG tablet Take 1 tablet by mouth 3 times daily as needed for Pain 5/4/22   Samantha Ham MD   docusate sodium (COLACE) 100 MG capsule Take 1 capsule by mouth 2 times daily as needed for Constipation 5/4/22   Samantha Ham MD         REVIEW OF SYSTEMS       Review of Systems   Constitutional:  Negative for chills and fever. HENT:  Positive for dental problem and facial swelling. Negative for congestion and rhinorrhea. Eyes:  Negative for visual disturbance. Respiratory:  Negative for cough and shortness of breath. Cardiovascular:  Negative for chest pain. Gastrointestinal:  Negative for abdominal pain, constipation, diarrhea, nausea and vomiting. Genitourinary:  Negative for dysuria and frequency. Musculoskeletal:  Negative for back pain and neck pain. Skin:  Negative for rash. Neurological:  Negative for weakness, numbness and headaches.      PHYSICAL EXAM      INITIAL VITALS:   BP (!) 169/106   Pulse 88   Temp 97.7 °F (36.5 °C)   Resp 18   Ht 5' 9\" (1.753 m) Wt 220 lb (99.8 kg)   SpO2 94%   BMI 32.49 kg/m²     Physical Exam  Constitutional:       General: He is not in acute distress.     Appearance: Normal appearance. He is not ill-appearing, toxic-appearing or diaphoretic.   HENT:      Head: Normocephalic and atraumatic.      Mouth/Throat:      Mouth: Mucous membranes are moist.      Pharynx: Oropharynx is clear.      Comments: Patient reluctant to open mouth, no significant trismus otherwise.  He does have swelling and tenderness palpation of the submandibular region in addition to the buccal region.  Tenderness palpation along the entire gumline on the left, more on the maxillary line than mandible.  Poor dentition throughout, no drainable abscess visualized.  Tongue may be slightly elevated.  No tenderness palpation under the tongue but submandibular region is more firm  Eyes:      Extraocular Movements: Extraocular movements intact.   Cardiovascular:      Rate and Rhythm: Normal rate and regular rhythm.      Heart sounds: Normal heart sounds. No murmur heard.  Pulmonary:      Effort: Pulmonary effort is normal. No respiratory distress.      Breath sounds: Normal breath sounds. No wheezing or rhonchi.   Abdominal:      Palpations: Abdomen is soft.      Tenderness: There is no abdominal tenderness.   Musculoskeletal:         General: Normal range of motion.      Cervical back: Normal range of motion and neck supple.   Skin:     General: Skin is warm and dry.   Neurological:      General: No focal deficit present.      Mental Status: He is alert and oriented to person, place, and time.         DDX/DIAGNOSTIC RESULTS / EMERGENCY DEPARTMENT COURSE / MDM     Medical Decision Making  57-year-old male presenting with left-sided facial swelling and pain.  History of dental infections in the past.  Patient is in no acute distress but does appear very anxious on my exam, pacing the room, states he cannot get comfortable.  He is afebrile.  Exam as noted above and concerning  for possible Ludewig's versus larger abscess given degree of swelling and submandibular involvement. He does have tenderness in multiple regions as well including parotid region, along gumline and submandibular. We will obtain CT soft tissue neck to evaluate for any obvious abscess or signs of Derrlel's. We will treat with antibiotics regardless, may need admission if there is extensive involvement of infection. Otherwise plan on outpatient dental follow-up. Amount and/or Complexity of Data Reviewed  Labs: ordered. Radiology: ordered. Decision-making details documented in ED Course. Risk  Prescription drug management. EKG      All EKG's are interpreted by the Emergency Department Physician who either signs or Co-signs this chart in the absence of a cardiologist.    EMERGENCY DEPARTMENT COURSE:      ED Course as of 02/24/23 1139   Thu Feb 23, 2023   1849 CT SOFT TISSUE NECK W CONTRAST  IMPRESSION:  1. Periodontal disease with lucency in the left mandible. There is a small  adjacent soft tissue abscess. There is also mucosal thickening in the left  maxillary sinus and edema in the subcutaneous soft tissues of the left face. 2. Prominent left cervical lymph nodes, likely reactive. 3. 1.3 cm peripherally calcified lesion in the inferior left parotid gland. This could represent a primary parotid lesion such as pleomorphic adenoma. 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 ENT physician states this is relatively common benign tumor of parotid gland. But patient still needs ENT follow up as it can transform into malignancy. She was updated on this plan. He was given clindamycin for dental infection treatment. Return precautions given. [JS]      ED Course User Index  [JS] Tod Necessary, DO       PROCEDURES:      CONSULTS:  IP CONSULT TO OTOLARYNGOLOGY      FINAL IMPRESSION      1. Dental abscess    2.  Parotid mass          DISPOSITION / PLAN     DISPOSITION Decision To Discharge 02/23/2023 06:50:22 PM      PATIENT REFERRED TO:  Rory Marti MD  [address]   [address]  [id number]  [phone]    Schedule an appointment as soon as possible for a visit in 3 days      Lorena Ramirez [address] [place] [phone]    Schedule an appointment as soon as possible for a visit in 3 days      DISCHARGE MEDICATIONS:  Discharge Medication List as of 2/23/2023  7:21 PM        START taking these medications    Details   clindamycin (CLEOCIN) 150 MG capsule Take 3 capsules by mouth 3 times daily for 7 days, Disp-63 capsule, R-0Normal             Leydi Farrell DO  Emergency Medicine Resident    (Please note that portions of thisnote were completed with a voice recognition program.  Efforts were made to edit the dictations but occasionally words are mis-transcribed.)        Leydi Farrell DO  Resident  02/24/23 1071

## 2023-02-23 NOTE — ED PROVIDER NOTES
Faculty Sign-Out Attestation  Handoff taken on the following patient from prior Attending Physician: Wesley Canada    I was available and discussed any additional care issues that arose and coordinated the management plans with the resident(s) caring for the patient during my duty period. Any areas of disagreement with residents documentation of care or procedures are noted on the chart. I was personally present for the key portions of any/all procedures during my duty period. I have documented in the chart those procedures where I was not present during the key portions. 52-year-old male with right maxillary pain and tenderness and swelling under the tongue. Getting CT to rule out Robb's angina.   If CT is negative, anticipate discharge on clindamycin (penicillin allergy) with dental follow-up    Sawyer Abbasi MD  Attending Physician        Sawyer Abbasi MD  02/23/23 8905

## 2023-02-24 ASSESSMENT — ENCOUNTER SYMPTOMS
CONSTIPATION: 0
COUGH: 0
NAUSEA: 0
SHORTNESS OF BREATH: 0
VOMITING: 0
RHINORRHEA: 0
BACK PAIN: 0
FACIAL SWELLING: 1
DIARRHEA: 0
ABDOMINAL PAIN: 0

## 2023-02-24 NOTE — DISCHARGE INSTRUCTIONS
You were seen in the emergency department today with left facial swelling. Clindamycin was prescribed for dental infection. Your CT also showed a mass in the parotid gland. You need to follow-up with ENT for further work-up of this mass. It is technically benign, but will need to be watched by the ENT team.    If you have any new or worsening symptoms, please return to the ED for reevaluation. Call today or tomorrow for a follow up with your dentist tomorrow, [address] [facility] or [facility] at [phone] or one of the dentists provided for follow up. Take your medication as indicated, if you are given an antibiotic then make sure you get the prescription filled and take the antibiotics until finished. Drink plenty of water while taking the antibiotics. Avoid drinking alcohol while taking antibiotics. [facility] has some antibiotics for free; [facility] has a 4 dollar prescription plan for some antibiotics. Use ibuprofen or Tylenol (unless prescribed medications that have Tylenol in it) for pain. You can take over the counter Ibuprofen (advil) tablets (4 every 8 hours or 3 every 6 hours or 2 every 4 hours). You can also use over the counter orajel as directed. Return to the Emergency Department for fever > 101.5, swelling to face, redness on face, drainage from tooth, any other care or concern.

## 2023-03-06 ENCOUNTER — OFFICE VISIT (OUTPATIENT)
Dept: FAMILY MEDICINE CLINIC | Age: 58
End: 2023-03-06

## 2023-03-06 VITALS
BODY MASS INDEX: 33.03 KG/M2 | WEIGHT: 223 LBS | DIASTOLIC BLOOD PRESSURE: 105 MMHG | SYSTOLIC BLOOD PRESSURE: 156 MMHG | HEART RATE: 44 BPM | HEIGHT: 69 IN

## 2023-03-06 DIAGNOSIS — K11.8 MASS OF LEFT PAROTID GLAND: ICD-10-CM

## 2023-03-06 DIAGNOSIS — Z09 HOSPITAL DISCHARGE FOLLOW-UP: Primary | ICD-10-CM

## 2023-03-06 SDOH — ECONOMIC STABILITY: HOUSING INSECURITY
IN THE LAST 12 MONTHS, WAS THERE A TIME WHEN YOU DID NOT HAVE A STEADY PLACE TO SLEEP OR SLEPT IN A SHELTER (INCLUDING NOW)?: NO

## 2023-03-06 SDOH — ECONOMIC STABILITY: FOOD INSECURITY: WITHIN THE PAST 12 MONTHS, THE FOOD YOU BOUGHT JUST DIDN'T LAST AND YOU DIDN'T HAVE MONEY TO GET MORE.: SOMETIMES TRUE

## 2023-03-06 SDOH — ECONOMIC STABILITY: INCOME INSECURITY: HOW HARD IS IT FOR YOU TO PAY FOR THE VERY BASICS LIKE FOOD, HOUSING, MEDICAL CARE, AND HEATING?: NOT HARD AT ALL

## 2023-03-06 SDOH — ECONOMIC STABILITY: FOOD INSECURITY: WITHIN THE PAST 12 MONTHS, YOU WORRIED THAT YOUR FOOD WOULD RUN OUT BEFORE YOU GOT MONEY TO BUY MORE.: SOMETIMES TRUE

## 2023-03-06 ASSESSMENT — PATIENT HEALTH QUESTIONNAIRE - PHQ9
SUM OF ALL RESPONSES TO PHQ9 QUESTIONS 1 & 2: 1
SUM OF ALL RESPONSES TO PHQ QUESTIONS 1-9: 1
SUM OF ALL RESPONSES TO PHQ QUESTIONS 1-9: 1
1. LITTLE INTEREST OR PLEASURE IN DOING THINGS: 0
SUM OF ALL RESPONSES TO PHQ QUESTIONS 1-9: 1
2. FEELING DOWN, DEPRESSED OR HOPELESS: 1
SUM OF ALL RESPONSES TO PHQ QUESTIONS 1-9: 1

## 2023-03-06 NOTE — PROGRESS NOTES
Post-Discharge Transitional Care  Follow Up      William Barrera   YOB: 1965    Date of Office Visit:  3/6/2023  Date of Hospital Admission: 2/23/23  Date of Hospital Discharge: 2/23/23  Risk of hospital readmission (high >=14%. Medium >=10%) :No data recorded    Care management risk score Rising risk (score 2-5) and Complex Care (Scores >=6): No Risk Score On File     Non face to face  following discharge, date last encounter closed (first attempt may have been earlier): *No documented post hospital discharge outreach found in the last 14 days    Call initiated 2 business days of discharge: *No response recorded in the last 14 days    ASSESSMENT/PLAN:   Hospital discharge follow-up  -Reviewed all labs and imaging with patient  -     NE DISCHARGE MEDS RECONCILED W/ CURRENT OUTPATIENT MED LIST  Mass of left parotid gland  -Discussed at length CT scan findings  -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 Ilsa Hager for further evaluation  -If patient's insurance is not accepted by Dr. Ilsa Hager I did instruct the patient to call his insurance to find out what specialist takes his insurance and then we will put in an external referral  -F/u 4 weeks  -Patient voiced understanding and is in agreement  -     [facility], Otol[facility]ology, [facility] Decision Making: straightforward  Return in 4 weeks (on 4/3/2023). On this date 3/6/2023 I have spent 20 minutes reviewing previous notes, test results and face to face with the patient discussing the diagnosis and importance of compliance with the treatment plan as well as documenting on the day of the visit. Subjective:   HPI:  Follow up of Hospital problems/diagnosis(es):     Inpatient course: Discharge summary reviewed- see chart. Interval history/Current status: Per chart review, patient was recently seen at [facility] on 2/23/2023 for dental abscess.   CT soft tissue neck showed periodontal disease with lucency in the left mandible. There was a small adjacent soft tissue abscess. There was also a 1.3 peripheral calcified lesion in the inferior left parotid gland which could have represented pleomorphic adenoma. ENT was consulted recommended outpatient follow-up. She was subsequently discharged on clindamycin. Patient states that he has tried to establish care with Dr. Agustín OROSCO who reportedly does not take his insurance. He was then referred to Dr. Andie Arevalo who states that he does not deal with parotid masses. We will provide patient with another referral to Dr. Charisma Kc. Since his discharge, patient states that he no longer has any dental pain. He finished his course of antibiotics. He denies any fever or chills. No difficulty chewing, swallowing, eating, or tolerating secretions. Denies chest pain, SOB, or palpitations. Patient Active Problem List   Diagnosis    Lumbosacral neuritis       Medications listed as ordered at the time of discharge from hospital     Medication List            Accurate as of March 6, 2023 12:05 PM. If you have any questions, ask your nurse or doctor. CONTINUE taking these medications      budesonide-formoterol 80-4.5 MCG/ACT Aero  Commonly known as: Symbicort  Inhale 2 puffs into the lungs 2 times daily     docusate sodium 100 MG capsule  Commonly known as: COLACE  Take 1 capsule by mouth 2 times daily as needed for Constipation     ibuprofen 600 MG tablet  Commonly known as: ADVIL;MOTRIN  Take 1 tablet by mouth 3 times daily as needed for Pain                Medications marked \"taking\" at this time  No outpatient medications have been marked as taking for the 3/6/23 encounter (Office Visit) with Seble Mancini MD.        Medications patient taking as of now reconciled against medications ordered at time of hospital discharge: Yes    A comprehensive review of systems was negative except for what was noted in the HPI.     Objective:    BP (!) 156/105 (Site: Right Upper Arm, Position: Sitting, Cuff Size: Medium Adult)   Pulse (!) 44   Ht 5' 9\" (1.753 m)   Wt 223 lb (101.2 kg)   BMI 32.93 kg/m²   General Appearance: alert and oriented to person, place and time, well developed and well- nourished, in no acute distress  Skin: warm and dry, no rash or erythema  Head: normocephalic and atraumatic  Eyes: pupils equal, round, and reactive to light, extraocular eye movements intact, conjunctivae normal  ENT: tympanic membrane, external ear and ear canal normal bilaterally, nose without deformity, nasal mucosa and turbinates normal without polyps  Neck: supple and non-tender without mass, no thyromegaly or thyroid nodules, no cervical lymphadenopathy  Pulmonary/Chest: clear to auscultation bilaterally- no wheezes, rales or rhonchi, normal air movement, no respiratory distress  Cardiovascular: normal rate, regular rhythm, normal S1 and S2, no murmurs, rubs, clicks, or gallops, distal pulses intact, no carotid bruits  Abdomen: soft, non-tender, non-distended, normal bowel sounds, no masses or organomegaly  Extremities: no cyanosis, clubbing or edema  Musculoskeletal: normal range of motion, no joint swelling, deformity or tenderness  Neurologic: reflexes normal and symmetric, no cranial nerve deficit, gait, coordination and speech normal      An electronic signature was used to authenticate this note.   --Lisa Castorena MD

## 2023-03-06 NOTE — PROGRESS NOTES
Visit Information    Have you changed or started any medications since your last visit including any over-the-counter medicines, vitamins, or herbal medicines? no   Have you stopped taking any of your medications? Is so, why? -  no  Are you having any side effects from any of your medications? - no    Have you seen any other physician or provider since your last visit?  no   Have you had any other diagnostic tests since your last visit? yes - Pt had labs and imaging done on 2/23/23   Have you been seen in the emergency room and/or had an admission in a hospital since we last saw you?  yes - Pt seen in [facility] ED on 2/23/23 for Dental Abscess and Parotid Mass   Have you had your routine dental cleaning in the past 6 months?  no     Do you have an active MyChart account? If no, what is the barrier?   Yes    Patient Care Team:  Carolyne Scherer MD as PCP - General (Emergency Medicine)    Medical History Review  Past Medical, Family, and Social History reviewed and does contribute to the patient presenting condition    Health Maintenance   Topic Date Due    COVID-19 Vaccine (1) Never done    Pneumococcal 0-64 years Vaccine (1 - PCV) Never done    HIV screen  Never done    Hepatitis C screen  Never done    DTaP/Tdap/Td vaccine (1 - Tdap) Never done    Lipids  Never done    Colorectal Cancer Screen  Never done    Shingles vaccine (1 of 2) Never done    Flu vaccine (1) Never done    A1C test (Diabetic or Prediabetic)  11/26/2022    Annual Wellness Visit (AWV)  02/23/2023    Low dose CT lung screening  05/13/2023    Depression Screen  06/03/2023    Hepatitis A vaccine  Aged Out    Hib vaccine  Aged Out    Meningococcal (ACWY) vaccine  Aged Out

## 2023-03-06 NOTE — PROGRESS NOTES
Attending Physician Statement  I have discussed the care of Lenard Bueno 65 y.o. male, including pertinent history and exam findings, with the resident Dr. Clyde Magaña MD.    History and Exam:   Chief Complaint   Patient presents with    Follow-Up from Hospital     Pt seen in [facility] ED on 2/23/23 for Dental Abscess and Parotid Mass . .. facial swelling has decreased    Referral - General     Referral for Parotid Mass needed       Past Medical History:   Diagnosis Date    HTN (hypertension)     no meds    Spondylosis      Allergies   Allergen Reactions    Amoxicillin     Shrimp Flavor       I have seen and examined the patient and the key elements of the encounter have been performed by me. BP Readings from Last 3 Encounters:   03/06/23 (!) 156/105   02/23/23 (!) 169/106   07/19/22 137/82     BP (!) 156/105 (Site: Right Upper Arm, Position: Sitting, Cuff Size: Medium Adult)   Pulse (!) 44   Ht 5' 9\" (1.753 m)   Wt 223 lb (101.2 kg)   BMI 32.93 kg/m²   Lab Results   Component Value Date    WBC 17.1 (H) 02/23/2023    HGB 18.3 (H) 02/23/2023    HCT 53.9 (H) 02/23/2023    PLT See Reflexed IPF Result 02/23/2023     02/23/2023    K 4.0 02/23/2023     02/23/2023    CREATININE 0.86 02/23/2023    BUN 13 02/23/2023    CO2 21 02/23/2023    INR 0.9 03/17/2022    LABA1C 5.8 11/26/2021     No results found for: LABPROT, LABALBU  No results found for: IRON, TIBC, FERRITIN  No results found for: LDLCALC, LDLCHOLESTEROL, LDLDIRECT  I agree with the assessment, plan and the diagnosis of    Diagnosis Orders   1. Hospital discharge follow-up  ID DISCHARGE MEDS RECONCILED W/ CURRENT OUTPATIENT MED LIST      2. Mass of left parotid gland  VERITO - Ken Franklin MD, Otolaryngology, [facility]       . I agree with orders as documented by the resident. More than 25 minutes spent  in face to face encounter with the patient and more than half in counseling. Patient's questions were answered.    Patient Voiced understanding to the counseling. Return in 4 weeks (on 4/3/2023).    (GC Modifier)-Dr. Ana Rodgers MD

## 2024-02-07 SDOH — HEALTH STABILITY: PHYSICAL HEALTH
ON AVERAGE, HOW MANY DAYS PER WEEK DO YOU ENGAGE IN MODERATE TO STRENUOUS EXERCISE (LIKE A BRISK WALK)?: PATIENT DECLINED

## 2024-02-08 ENCOUNTER — OFFICE VISIT (OUTPATIENT)
Dept: FAMILY MEDICINE CLINIC | Age: 59
End: 2024-02-08
Payer: MEDICARE

## 2024-02-08 VITALS
HEART RATE: 93 BPM | SYSTOLIC BLOOD PRESSURE: 136 MMHG | BODY MASS INDEX: 37.47 KG/M2 | DIASTOLIC BLOOD PRESSURE: 88 MMHG | HEIGHT: 69 IN | OXYGEN SATURATION: 96 % | TEMPERATURE: 97.2 F | WEIGHT: 253 LBS

## 2024-02-08 DIAGNOSIS — Z13.1 SCREENING FOR DIABETES MELLITUS (DM): ICD-10-CM

## 2024-02-08 DIAGNOSIS — Z00.00 HEALTHCARE MAINTENANCE: ICD-10-CM

## 2024-02-08 DIAGNOSIS — Z87.891 PERSONAL HISTORY OF TOBACCO USE: ICD-10-CM

## 2024-02-08 DIAGNOSIS — Z13.220 SCREENING FOR HYPERLIPIDEMIA: ICD-10-CM

## 2024-02-08 DIAGNOSIS — G89.29 CHRONIC BILATERAL LOW BACK PAIN WITH BILATERAL SCIATICA: Primary | ICD-10-CM

## 2024-02-08 DIAGNOSIS — Z59.82 TRANSPORTATION INSECURITY: ICD-10-CM

## 2024-02-08 DIAGNOSIS — M54.41 CHRONIC BILATERAL LOW BACK PAIN WITH BILATERAL SCIATICA: Primary | ICD-10-CM

## 2024-02-08 DIAGNOSIS — M54.42 CHRONIC BILATERAL LOW BACK PAIN WITH BILATERAL SCIATICA: Primary | ICD-10-CM

## 2024-02-08 PROBLEM — J44.9 SUSPECTED CHRONIC OBSTRUCTIVE PULMONARY DISEASE BASED ON INITIAL EVALUATION (HCC): Status: ACTIVE | Noted: 2024-02-08

## 2024-02-08 PROCEDURE — 99211 OFF/OP EST MAY X REQ PHY/QHP: CPT | Performed by: FAMILY MEDICINE

## 2024-02-08 PROCEDURE — G0296 VISIT TO DETERM LDCT ELIG: HCPCS

## 2024-02-08 SDOH — ECONOMIC STABILITY - TRANSPORTATION SECURITY: TRANSPORTATION INSECURITY: Z59.82

## 2024-02-08 ASSESSMENT — PATIENT HEALTH QUESTIONNAIRE - PHQ9
SUM OF ALL RESPONSES TO PHQ QUESTIONS 1-9: 6
SUM OF ALL RESPONSES TO PHQ9 QUESTIONS 1 & 2: 6
SUM OF ALL RESPONSES TO PHQ QUESTIONS 1-9: 6
2. FEELING DOWN, DEPRESSED OR HOPELESS: 3
1. LITTLE INTEREST OR PLEASURE IN DOING THINGS: 3

## 2024-02-08 ASSESSMENT — ENCOUNTER SYMPTOMS
GASTROINTESTINAL NEGATIVE: 1
BACK PAIN: 1
RESPIRATORY NEGATIVE: 1

## 2024-02-08 NOTE — PROGRESS NOTES
Visit Information    Have you changed or started any medications since your last visit including any over-the-counter medicines, vitamins, or herbal medicines? no   Have you stopped taking any of your medications? Is so, why? -  yes - see list  Are you having any side effects from any of your medications? - no    Have you seen any other physician or provider since your last visit?  no   Have you had any other diagnostic tests since your last visit?  no   Have you been seen in the emergency room and/or had an admission in a hospital since we last saw you?  no   Have you had your routine dental cleaning in the past 6 months?  no     Do you have an active MyChart account? If no, what is the barrier?  Yes    Patient Care Team:  Lamonte Villanueva MD as PCP - General (Family Medicine)    Medical History Review  Past Medical, Family, and Social History reviewed and does not contribute to the patient presenting condition    Health Maintenance   Topic Date Due    Hepatitis B vaccine (1 of 3 - 3-dose series) Never done    COVID-19 Vaccine (1) Never done    Pneumococcal 0-64 years Vaccine (1 - PCV) Never done    HIV screen  Never done    Hepatitis C screen  Never done    DTaP/Tdap/Td vaccine (1 - Tdap) Never done    Lipids  Never done    Colorectal Cancer Screen  Never done    Shingles vaccine (1 of 2) Never done    A1C test (Diabetic or Prediabetic)  11/26/2022    Low dose CT lung screening &/or counseling  05/13/2023    Flu vaccine (1) Never done    Depression Screen  03/06/2024    Hepatitis A vaccine  Aged Out    Hib vaccine  Aged Out    Polio vaccine  Aged Out    Meningococcal (ACWY) vaccine  Aged Out    Diabetes screen  Discontinued             
weeks (around 2/22/2024) for labs.   ([employer] ) Dr. ASHLEY WALKER MD  
with at least a 20 pack-year smoking history who currently smoke or have quit in the last 15 years    - SD VISIT TO DISCUSS LUNG CA SCREEN W LDCT  - CT Lung Screen (Initial/Annual/Baseline); Future          Requested Prescriptions      No prescriptions requested or ordered in this encounter       There are no discontinued medications.    Lars received counseling on the following healthy behaviors: nutrition, exercise and medication adherence    Discussed use,benefit, and side effects of prescribed medications.  Barriers to medication compliance addressed.      All patient questions answered.  Pt voiced understanding.     Return in about 2 weeks (around 2/22/2024) for labs.        Disclaimer: Some orall of this note was transcribed using voice-recognition software.This may cause typographical errors occasionally. Although all effort is made to fix these errors, please do not hesitate to contact our office if there isany concern with the understanding of this note.

## 2024-02-09 ENCOUNTER — CARE COORDINATION (OUTPATIENT)
Dept: CARE COORDINATION | Age: 59
End: 2024-02-09

## 2024-02-09 NOTE — CARE COORDINATION
Patient was referred to  by Davonte Lopez/DANNY, who stated that this patient has SDOH, and needs transportation assistance.  HC phoned patient made introductions and stated the reason for the call.  HC engaged in conversation with the patient and found that he already has transportation through his insurance.  HC provided the patient with her contact information and suggested that he call for social resources, should he need them.    Plan of Care  HC will follow up with patient for resources

## 2024-02-20 ENCOUNTER — TELEPHONE (OUTPATIENT)
Dept: FAMILY MEDICINE CLINIC | Age: 59
End: 2024-02-20

## 2024-02-20 NOTE — TELEPHONE ENCOUNTER
[place] home care waiver contacted office in regarding to the form that was sent over for patient. Writer inform in provider mailbox pending signature from attending. All paperwork can take 7-10 business days.

## 2024-02-21 ENCOUNTER — TELEPHONE (OUTPATIENT)
Dept: FAMILY MEDICINE CLINIC | Age: 59
End: 2024-02-21

## 2024-02-21 NOTE — TELEPHONE ENCOUNTER
Patient called about his [facility] paperwork. Patient was informed that paperwork is in providers box and just needs to be sign by attending. As soon as it is done it will be faxed . Paperwork can take up to 7 to 10 business days .

## 2024-02-23 ENCOUNTER — TELEPHONE (OUTPATIENT)
Dept: FAMILY MEDICINE CLINIC | Age: 59
End: 2024-02-23

## 2024-02-23 ENCOUNTER — CARE COORDINATION (OUTPATIENT)
Dept: CARE COORDINATION | Age: 59
End: 2024-02-23

## 2024-02-23 NOTE — CARE COORDINATION
HC phoned and spoke to the patient regarding social needs.  Patient had previously been referred for medical transportation, but stated that he already has the needed transportation.    Today, patient denied that he has any social needs.  HC encouraged patient to call fo any future social needs.    Plan of Care  HC will sign off of patient at this time.

## 2024-02-23 NOTE — TELEPHONE ENCOUNTER
Patient called again about this paperwork from [facility], patient was informed we were just wait on the attending to sign paperwork and it will be faxed back to [facility] as soon as we can and the provider does have 7 to 10 business days to complete the paperwork.

## 2024-03-09 PROBLEM — Z00.00 HEALTHCARE MAINTENANCE: Status: RESOLVED | Noted: 2024-02-08 | Resolved: 2024-03-09

## 2024-04-25 DIAGNOSIS — G89.29 CHRONIC BILATERAL LOW BACK PAIN WITH BILATERAL SCIATICA: Primary | ICD-10-CM

## 2024-04-25 DIAGNOSIS — M54.41 CHRONIC BILATERAL LOW BACK PAIN WITH BILATERAL SCIATICA: Primary | ICD-10-CM

## 2024-04-25 DIAGNOSIS — M54.42 CHRONIC BILATERAL LOW BACK PAIN WITH BILATERAL SCIATICA: Primary | ICD-10-CM

## 2024-04-25 NOTE — PROGRESS NOTES
FACE to FACE for Rollator Walker    Lars GUARDADO Yudy was evaluated today and a DME order was entered for a wheeled walker with seat because he requires this to successfully complete daily living tasks of toileting, personal cares, ambulating, and meal preparation.  A wheeled walker with seat is necessary due to the patient's unsteady gait, upper body weakness, inability to  and ambulation device, ambulating only short distances by pushing a walker, and the need to sit for a short time before resuming ambulation.  These tasks cannot be completed with a lesser ambulation device such as a cane, crutch, or standard walker.  The need for this equipment was discussed with the patient and he understands and is in agreement.

## 2024-06-03 ENCOUNTER — TELEPHONE (OUTPATIENT)
Dept: FAMILY MEDICINE CLINIC | Age: 59
End: 2024-06-03

## 2024-06-03 NOTE — TELEPHONE ENCOUNTER
Alan from [facility] called asking for patient's last office note and a medication list